# Patient Record
Sex: MALE | Race: WHITE | Employment: OTHER | URBAN - METROPOLITAN AREA
[De-identification: names, ages, dates, MRNs, and addresses within clinical notes are randomized per-mention and may not be internally consistent; named-entity substitution may affect disease eponyms.]

---

## 2017-01-03 ENCOUNTER — HOSPITAL ENCOUNTER (OUTPATIENT)
Dept: PHYSICAL THERAPY | Facility: CLINIC | Age: 67
Setting detail: THERAPIES SERIES
End: 2017-01-03
Attending: PHYSICAL MEDICINE & REHABILITATION
Payer: MEDICARE

## 2017-01-03 PROCEDURE — 97112 NEUROMUSCULAR REEDUCATION: CPT | Mod: GP | Performed by: PHYSICAL THERAPIST

## 2017-01-03 PROCEDURE — 40000719 ZZHC STATISTIC PT DEPARTMENT NEURO VISIT: Performed by: PHYSICAL THERAPIST

## 2017-01-05 ENCOUNTER — HOSPITAL ENCOUNTER (OUTPATIENT)
Dept: PHYSICAL THERAPY | Facility: CLINIC | Age: 67
Setting detail: THERAPIES SERIES
End: 2017-01-05
Attending: PHYSICAL MEDICINE & REHABILITATION
Payer: MEDICARE

## 2017-01-05 PROCEDURE — 40000719 ZZHC STATISTIC PT DEPARTMENT NEURO VISIT: Performed by: PHYSICAL THERAPIST

## 2017-01-05 PROCEDURE — 97112 NEUROMUSCULAR REEDUCATION: CPT | Mod: GP | Performed by: PHYSICAL THERAPIST

## 2017-01-05 PROCEDURE — 97116 GAIT TRAINING THERAPY: CPT | Mod: GP | Performed by: PHYSICAL THERAPIST

## 2017-01-17 ENCOUNTER — HOSPITAL ENCOUNTER (OUTPATIENT)
Dept: PHYSICAL THERAPY | Facility: CLINIC | Age: 67
Setting detail: THERAPIES SERIES
End: 2017-01-17
Attending: PHYSICAL MEDICINE & REHABILITATION
Payer: MEDICARE

## 2017-01-17 PROCEDURE — 40000719 ZZHC STATISTIC PT DEPARTMENT NEURO VISIT: Performed by: PHYSICAL THERAPIST

## 2017-01-17 PROCEDURE — 97112 NEUROMUSCULAR REEDUCATION: CPT | Mod: GP | Performed by: PHYSICAL THERAPIST

## 2017-01-19 ENCOUNTER — HOSPITAL ENCOUNTER (OUTPATIENT)
Dept: PHYSICAL THERAPY | Facility: CLINIC | Age: 67
Setting detail: THERAPIES SERIES
End: 2017-01-19
Attending: PHYSICAL MEDICINE & REHABILITATION
Payer: MEDICARE

## 2017-01-19 PROCEDURE — 40000719 ZZHC STATISTIC PT DEPARTMENT NEURO VISIT: Performed by: PHYSICAL THERAPIST

## 2017-01-19 PROCEDURE — G8979 MOBILITY GOAL STATUS: HCPCS | Mod: GP,CK | Performed by: PHYSICAL THERAPIST

## 2017-01-19 PROCEDURE — 97116 GAIT TRAINING THERAPY: CPT | Mod: GP | Performed by: PHYSICAL THERAPIST

## 2017-01-19 PROCEDURE — G8978 MOBILITY CURRENT STATUS: HCPCS | Mod: GP,CM | Performed by: PHYSICAL THERAPIST

## 2017-01-19 NOTE — PROGRESS NOTES
Outpatient Physical Therapy Progress Note     Patient: Rene Chowdhury Jr.  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      Objective Measurements:        Objective Measure: 25 foot walk  Details: 34.5 sec, 24 steps.         Objective Measure: Stairs   Details: up and down four 6 inch steps in step to pattern with rail and Adama.  assist for left LE contact due to excessive adduction.  assist and cues descending to bring COM forward.          Goals:    Goal Identifier 3   Goal Description Gentry will be able to ambulate with a wheeled walker in 14.11 sec or less with Adama for left hand on walker for greater safety and efficiency for functional gait in his home.    Target Date 17   Date Met   not met   Progress:  Goal remains in progress.  Performance has fluctuated and is dependent on degree of spasticity he is experiencing on a given day.  Performance has improved over the last few weeks but still slower than previous months.      Goal Identifier 4   Goal Description Gentry will be able to perform a home activity program with assist of his family to address his system impairments and functional limitations.   Target Date 17   Date Met      Progress:  In progress - Gentry and wife Karen state they are doing more standing activity and other therapy activities each day.  Gentry still has issues with cooperation at home at times, per his wife, but Gentry states he is trying to be better.      Goal Identifier New goal 5   Goal Description Gentry will be able to ambulate up and down a flight of steps in a step-to pattern with assist and use of rail in his home environment to demonstrate improved access in his home  without need for chair lift   Target Date 02/06/17   Date Met      Progress: in progress, partially met.  Gentry can navigate up four 6 inch steps in a step to pattern leading with the right, Ue support of railing on the right with Adama.  Descending he requires increased facilitation and assist for safe foot placement and forward wt shift/alignment in frontal plane.  Wife is concerned about his safety with descending at home as he would not have a railing on the right side with descending.      Goal Identifier new goal 6   Goal Description Gentry will be able to ambulate with a quad cane or walking stick for distances of 50 feet with CGA for greater mobility and access in the home environment   Target Date 02/06/17   Date Met   partially met - making progress   Progress:  Gentry has been able to ambulate with a quad cane for distances up to 100 feet with min facilitation for postural control and fwd wt shift and cues for gait sequence and pattern.       Progress Toward Goals:   Progress this reporting period: Gentry has been having more difficulty with walking speed over the past several months, however recently has been showing a gradual improvement again.  Wife reports that at home his left foot position has been better and he has not had as many issues with inversion and supination of his left foot.  In therapy he is also demonstrating improved foot position in standing and walking as well, allowing better forward shift and safer gait pattern.  Still, he is seeming to still have increased spasticity in his left LE and foot than he did over the summer and charan months.  Gentry and his wife spoke to his physicians at the [facility] regarding possible Botox in his left foot/lower leg but they report that his physicians there are resistant to this.          Progress limited due to increase in spasticity in left LE, also noted in left UE.  Progress also cont to be slow due to decreased attention/distractibility,  spatial/visual deficits.  Overall Gentry has made continual gains and has the potential for further positive neuroplastic change to achieve continued functional gains.  It is felt Gentry would benefit from Botox trial to his left ankle/foot/great toe but [facility] are not in agreement with this.  He will then require additional physical therapy interventions and strategies to inhibit tone and promote normal movement patterns and alignments to increase strength and tissue length for progression of safe functional movement.      Plan:  Continue therapy per current plan of care.   Changes to therapy plan of care: cont 2 x per week, reducing frequency as clinically appropriate.     Continue with goals 4 and 6 until 5/9/17  Changes to goals:     Goal Identifier Modified goal 3   Goal Description Gentry will be able to ambulate with a wheeled walker in 24 sec or less with CGA for safety and use of gripeeze glove to secure left hand on walker for greater safety and efficiency for functional gait in his home.    Target Date 04/09/17       Goal Identifier modified goal 5   Goal Description Gentry will be able to ambulate up a flight of steps in a step-to pattern with Adama assist and use of rail on the right side in his home environment to demonstrate improved access in his home with decreased need for chair lift.   Target Date 03/09/17   Date Met      Progress:         Discharge:  No

## 2017-01-24 ENCOUNTER — HOSPITAL ENCOUNTER (OUTPATIENT)
Dept: PHYSICAL THERAPY | Facility: CLINIC | Age: 67
Setting detail: THERAPIES SERIES
End: 2017-01-24
Attending: PHYSICAL MEDICINE & REHABILITATION
Payer: MEDICARE

## 2017-01-24 PROCEDURE — 97116 GAIT TRAINING THERAPY: CPT | Mod: GP | Performed by: PHYSICAL THERAPIST

## 2017-01-24 PROCEDURE — 97112 NEUROMUSCULAR REEDUCATION: CPT | Mod: GP | Performed by: PHYSICAL THERAPIST

## 2017-01-24 PROCEDURE — 40000719 ZZHC STATISTIC PT DEPARTMENT NEURO VISIT: Performed by: PHYSICAL THERAPIST

## 2017-01-24 NOTE — ADDENDUM NOTE
Encounter addended by: Genesis Walters, PT on: 1/24/2017  2:10 PM<BR>     Documentation filed: Inpatient Document Flowsheet, Charges VN

## 2017-01-26 ENCOUNTER — HOSPITAL ENCOUNTER (OUTPATIENT)
Dept: PHYSICAL THERAPY | Facility: CLINIC | Age: 67
Setting detail: THERAPIES SERIES
End: 2017-01-26
Attending: PHYSICAL MEDICINE & REHABILITATION
Payer: MEDICARE

## 2017-01-26 PROCEDURE — 97112 NEUROMUSCULAR REEDUCATION: CPT | Mod: GP | Performed by: PHYSICAL THERAPIST

## 2017-01-26 PROCEDURE — 40000719 ZZHC STATISTIC PT DEPARTMENT NEURO VISIT: Performed by: PHYSICAL THERAPIST

## 2017-01-31 ENCOUNTER — HOSPITAL ENCOUNTER (OUTPATIENT)
Dept: PHYSICAL THERAPY | Facility: CLINIC | Age: 67
Setting detail: THERAPIES SERIES
End: 2017-01-31
Attending: PHYSICAL MEDICINE & REHABILITATION
Payer: MEDICARE

## 2017-01-31 PROCEDURE — 40000719 ZZHC STATISTIC PT DEPARTMENT NEURO VISIT: Performed by: PHYSICAL THERAPIST

## 2017-01-31 PROCEDURE — 97112 NEUROMUSCULAR REEDUCATION: CPT | Mod: GP | Performed by: PHYSICAL THERAPIST

## 2017-01-31 PROCEDURE — 97116 GAIT TRAINING THERAPY: CPT | Mod: GP | Performed by: PHYSICAL THERAPIST

## 2017-02-01 NOTE — ADDENDUM NOTE
Encounter addended by: Genesis Walters, PT on: 2/1/2017  4:35 PM<BR>     Documentation filed: Inpatient Document Flowsheet, Notes Section

## 2017-02-01 NOTE — ADDENDUM NOTE
Encounter addended by: Genesis Walters, PT on: 2/1/2017  4:40 PM<BR>     Documentation filed: Notes Section

## 2017-02-01 NOTE — ADDENDUM NOTE
Encounter addended by: Genesis Walters, PT on: 2/1/2017  4:42 PM<BR>     Documentation filed: Inpatient Document Flowsheet

## 2017-02-01 NOTE — ADDENDUM NOTE
Encounter addended by: Genesis Walters, PT on: 2/1/2017  4:18 PM<BR>     Documentation filed: Notes Section

## 2017-02-01 NOTE — ADDENDUM NOTE
Encounter addended by: Genesis Walters, PT on: 2/1/2017  4:41 PM<BR>     Documentation filed: Inpatient Document Flowsheet

## 2017-02-01 NOTE — ADDENDUM NOTE
Encounter addended by: Genesis Walters, PT on: 2/1/2017  4:37 PM<BR>     Documentation filed: Inpatient Document Flowsheet

## 2017-02-02 ENCOUNTER — HOSPITAL ENCOUNTER (OUTPATIENT)
Dept: PHYSICAL THERAPY | Facility: CLINIC | Age: 67
Setting detail: THERAPIES SERIES
End: 2017-02-02
Attending: PHYSICAL MEDICINE & REHABILITATION
Payer: MEDICARE

## 2017-02-02 PROCEDURE — 97116 GAIT TRAINING THERAPY: CPT | Mod: GP | Performed by: PHYSICAL THERAPIST

## 2017-02-02 PROCEDURE — 40000719 ZZHC STATISTIC PT DEPARTMENT NEURO VISIT: Performed by: PHYSICAL THERAPIST

## 2017-02-07 ENCOUNTER — HOSPITAL ENCOUNTER (OUTPATIENT)
Dept: PHYSICAL THERAPY | Facility: CLINIC | Age: 67
Setting detail: THERAPIES SERIES
End: 2017-02-07
Attending: PHYSICAL MEDICINE & REHABILITATION
Payer: MEDICARE

## 2017-02-07 PROCEDURE — 97110 THERAPEUTIC EXERCISES: CPT | Mod: GP | Performed by: PHYSICAL THERAPIST

## 2017-02-07 PROCEDURE — 40000719 ZZHC STATISTIC PT DEPARTMENT NEURO VISIT: Performed by: PHYSICAL THERAPIST

## 2017-02-07 PROCEDURE — 97116 GAIT TRAINING THERAPY: CPT | Mod: GP | Performed by: PHYSICAL THERAPIST

## 2017-02-09 ENCOUNTER — HOSPITAL ENCOUNTER (OUTPATIENT)
Dept: PHYSICAL THERAPY | Facility: CLINIC | Age: 67
Setting detail: THERAPIES SERIES
End: 2017-02-09
Attending: PHYSICAL MEDICINE & REHABILITATION
Payer: MEDICARE

## 2017-02-09 PROCEDURE — 40000719 ZZHC STATISTIC PT DEPARTMENT NEURO VISIT: Performed by: PHYSICAL THERAPIST

## 2017-02-09 PROCEDURE — 97110 THERAPEUTIC EXERCISES: CPT | Mod: GP | Performed by: PHYSICAL THERAPIST

## 2017-02-09 PROCEDURE — 97116 GAIT TRAINING THERAPY: CPT | Mod: GP | Performed by: PHYSICAL THERAPIST

## 2017-02-14 ENCOUNTER — HOSPITAL ENCOUNTER (OUTPATIENT)
Dept: PHYSICAL THERAPY | Facility: CLINIC | Age: 67
Setting detail: THERAPIES SERIES
End: 2017-02-14
Attending: PHYSICAL MEDICINE & REHABILITATION
Payer: MEDICARE

## 2017-02-14 PROCEDURE — 97112 NEUROMUSCULAR REEDUCATION: CPT | Mod: GP | Performed by: PHYSICAL THERAPIST

## 2017-02-14 PROCEDURE — 40000719 ZZHC STATISTIC PT DEPARTMENT NEURO VISIT: Performed by: PHYSICAL THERAPIST

## 2017-02-14 PROCEDURE — 97110 THERAPEUTIC EXERCISES: CPT | Mod: GP | Performed by: PHYSICAL THERAPIST

## 2017-02-16 ENCOUNTER — HOSPITAL ENCOUNTER (OUTPATIENT)
Dept: PHYSICAL THERAPY | Facility: CLINIC | Age: 67
Setting detail: THERAPIES SERIES
End: 2017-02-16
Attending: PHYSICAL MEDICINE & REHABILITATION
Payer: MEDICARE

## 2017-02-16 PROCEDURE — 40000719 ZZHC STATISTIC PT DEPARTMENT NEURO VISIT: Performed by: PHYSICAL THERAPIST

## 2017-02-16 PROCEDURE — 97116 GAIT TRAINING THERAPY: CPT | Mod: GP | Performed by: PHYSICAL THERAPIST

## 2017-02-16 PROCEDURE — 97110 THERAPEUTIC EXERCISES: CPT | Mod: GP | Performed by: PHYSICAL THERAPIST

## 2017-02-21 ENCOUNTER — HOSPITAL ENCOUNTER (OUTPATIENT)
Dept: PHYSICAL THERAPY | Facility: CLINIC | Age: 67
Setting detail: THERAPIES SERIES
End: 2017-02-21
Attending: PHYSICAL MEDICINE & REHABILITATION
Payer: MEDICARE

## 2017-02-21 PROCEDURE — 40000719 ZZHC STATISTIC PT DEPARTMENT NEURO VISIT: Performed by: PHYSICAL THERAPIST

## 2017-02-21 PROCEDURE — 97116 GAIT TRAINING THERAPY: CPT | Mod: GP | Performed by: PHYSICAL THERAPIST

## 2017-02-21 PROCEDURE — 97110 THERAPEUTIC EXERCISES: CPT | Mod: GP | Performed by: PHYSICAL THERAPIST

## 2017-02-23 ENCOUNTER — HOSPITAL ENCOUNTER (OUTPATIENT)
Dept: PHYSICAL THERAPY | Facility: CLINIC | Age: 67
Setting detail: THERAPIES SERIES
End: 2017-02-23
Attending: PHYSICAL MEDICINE & REHABILITATION
Payer: MEDICARE

## 2017-02-23 PROCEDURE — 97116 GAIT TRAINING THERAPY: CPT | Mod: GP | Performed by: PHYSICAL THERAPIST

## 2017-02-23 PROCEDURE — G8979 MOBILITY GOAL STATUS: HCPCS | Mod: GP,CL | Performed by: PHYSICAL THERAPIST

## 2017-02-23 PROCEDURE — 97112 NEUROMUSCULAR REEDUCATION: CPT | Mod: GP | Performed by: PHYSICAL THERAPIST

## 2017-02-23 PROCEDURE — G8978 MOBILITY CURRENT STATUS: HCPCS | Mod: GP,CL | Performed by: PHYSICAL THERAPIST

## 2017-02-23 PROCEDURE — 40000719 ZZHC STATISTIC PT DEPARTMENT NEURO VISIT: Performed by: PHYSICAL THERAPIST

## 2017-02-23 NOTE — PROGRESS NOTES
[facility]      OUTPATIENT PHYSICAL THERAPY  PLAN OF TREATMENT FOR OUTPATIENT REHABILITATION    Patient's Last Name, First Name, M.I.                YOB: 1950  Rene Chowdhury                        Provider's Name  [facility] Medical Record No.  [id number]                               Onset Date: 11/7/2013   Start of Care Date: 8/20/2014    Type:     _X_PT   ___OT   ___SLP Medical Diagnosis: hemorrhagic CVA with left hemiplegia                       PT Diagnosis: dependence with mobility and self cares due to left hemiplegia and left neglect      _________________________________________________________________________________  Plan of Treatment:    Frequency/Duration: 2 x per week for 90 days decreasing to 1 x per week as clinically appropriate     Goals:    Goal Identifier Modified goal 3   Goal Description Gentry will be able to ambulate with a wheeled walker in 24 sec or less with CGA for safety and use of gripeeze glove to secure left hand on walker for greater safety and efficiency for functional gait in his home.    Target Date 04/09/17   Date Met      Progress:     Goal Identifier 4   Goal Description Gentry will be able to perform a home activity program with assist of his family to address his system impairments and functional limitations.   Target Date 05/09/17   Date Met      Progress:     Goal Identifier Modified goal 5   Goal Description Gentry will be able to ambulate up a flight of steps in a step-to pattern with Adama assist and use of rail on the right side in his home environment to demonstrate improved access in his home with decreased need for chair lift.    Target Date 03/09/17   Date Met      Progress:     Goal Identifier 6   Goal Description Gentry will be able to ambulate with a quad cane or walking stick for distances of 50 feet with CGA for greater  mobility and access in the home environment   Target Date 05/09/17   Date Met      Progress:     Progress Toward Goals:   See progress note dated 1/21/17    Certification date from 2/7/2017 to 5/8/2017.    Genesis Walters, PT          I CERTIFY THE NEED FOR THESE SERVICES FURNISHED UNDER        THIS PLAN OF TREATMENT AND WHILE UNDER MY CARE     (Physician co-signature of this document indicates review and certification of the therapy plan).                Referring Provider: Eddi Robles MD

## 2017-02-23 NOTE — PROGRESS NOTES
Outpatient Physical Therapy Progress Note     Patient: Rene Chowdhury Jr.  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      Objective Measurements:  Objective Measure: Sit to stand  Details: 5/5 trials maintained pressure on left foot througout transfer but still greater wt shift to right - cues to increase shift to left throughout movement.    Objective Measure: 25 foot walk  Details: with ottobock AFO 27.5 sec, 25 steps.  2nd trial 44 sec. hallway with distractions of other people.   Repeat trial -  22.6 sec, 20 steps.  20.3 sec, 21 steps.  no distractions, cues for larger step length.          Goal Identifier Modified goal 3   Goal Description Gentry will be able to ambulate with a wheeled walker in 24 sec or less with CGA for safety and use of gripeeze glove to secure left hand on walker for greater safety and efficiency for functional gait in his home.    Target Date 17   Date Met      Progress: Goal Met with use of ottobock AFO and when no distractions present.  With distractions pt needs cues to keep walking and stay on task.  Without AFO, spasticity and tissue restriction affects fluidity of gait and foot position, resulting in greater time.  Continue with this goal for consistency both with/ without AFO.      Goal Identifier 4   Goal Description Gentry will be able to perform a home activity program with assist of his family to address his system impairments and functional limitations.   Target Date 17   Date Met      Progress:  Doing well with standing activities at home.  Also doing some walking but both pt and wife feel nervous about walking at home due  to tone in left foot that causes inversion and supination at foot/ankle, decreasing his stability.  Making better effort to work on things at home.        Goal Identifier Modified goal 5   Goal Description Gentry will be able to ambulate up a flight of steps in a step-to pattern with Adama assist and use of rail on the right side in his home environment to demonstrate improved access in his home with decreased need for chair lift.    Target Date 03/09/17   Date Met      Progress: Stairs have not been addressed as much in therapy sessions due to focus on AFO trials for greater safety with gait.  Able to navigate up four 6 inch steps in a step to pattern leading with the right, Ue support of railing on the right with Adama. Descending he requires increased facilitation and assist for safe foot placement and forward wt shift/alignment in frontal plane. Wife is concerned about his safety with descending at home as he would not have a railing on the right side with descending     Goal Identifier 6   Goal Description Gentry will be able to ambulate with a quad cane or walking stick for distances of 50 feet with CGA for greater mobility and access in the home environment   Target Date 05/09/17   Date Met      Progress:  Gentry has been able to ambulate with a quad cane for distances up to 100 feet with min facilitation for postural control and fwd wt shift and cues for gait sequence and pattern.   Again, focus has been more on gait with walker and AFO over the recent weeks.       Progress Toward Goals:   Progress this reporting period: Gentry has shown improved gait stability and control with trials of off the shelf AFOs (Celso Blue Rocker and Ottobock Walk-On).  Arranging to have orthotist come to session for collaboration and consultation to determine best option and also if he may benefit more from a custom AFO vs. Off the shelf.  He still gets some inversion/supination of his left foot in the off the shelf AFOs due to  limited positioning control at the foot of these braces.  Still he seems to get better clearance and improved heelstrike although still not full.   In order for Gentry to improve his safety and stability with gait in order to increase the frequency of walking / mobility in the home, it is felt that he will need something to control the tone/spasticity and tissue restrictions in his left foot/ankle.  [facility] and [facility] in his left UE.  Unfortunately they do not feel that Botox trial to his left LE/Foot would be appropriate at this time.       Progress limited due to increased spasticity and malalignment of left foot/ankle.  This is limiting he and his wife's comfort level with increasing his gait frequency in the home due to some close calls with losing his balance due to his foot rolling (even with ankle support).  Gentry needs to have options for safe mobility in the home (with assist of his wife) to further increase his abilities, improve general conditioning for health and greater activity tolerance and for continued functional neuroplastic benefits.     Plan:  Continue therapy per current plan of care of 2 x perweek, reducing frequency as clinically appropriate.    Changes to goals: Continue with all goals until 5/9/17.     Discharge:  No

## 2017-03-14 ENCOUNTER — HOSPITAL ENCOUNTER (OUTPATIENT)
Dept: PHYSICAL THERAPY | Facility: CLINIC | Age: 67
Setting detail: THERAPIES SERIES
End: 2017-03-14
Attending: PHYSICAL MEDICINE & REHABILITATION
Payer: MEDICARE

## 2017-03-14 PROCEDURE — 97116 GAIT TRAINING THERAPY: CPT | Mod: GP | Performed by: PHYSICAL THERAPIST

## 2017-03-14 PROCEDURE — 40000719 ZZHC STATISTIC PT DEPARTMENT NEURO VISIT: Performed by: PHYSICAL THERAPIST

## 2017-03-16 ENCOUNTER — HOSPITAL ENCOUNTER (OUTPATIENT)
Dept: PHYSICAL THERAPY | Facility: CLINIC | Age: 67
Setting detail: THERAPIES SERIES
End: 2017-03-16
Attending: PHYSICAL MEDICINE & REHABILITATION
Payer: MEDICARE

## 2017-03-16 PROCEDURE — 40000719 ZZHC STATISTIC PT DEPARTMENT NEURO VISIT: Performed by: PHYSICAL THERAPIST

## 2017-03-16 PROCEDURE — 97116 GAIT TRAINING THERAPY: CPT | Mod: GP | Performed by: PHYSICAL THERAPIST

## 2017-03-16 PROCEDURE — 97110 THERAPEUTIC EXERCISES: CPT | Mod: GP | Performed by: PHYSICAL THERAPIST

## 2017-03-16 PROCEDURE — 97112 NEUROMUSCULAR REEDUCATION: CPT | Mod: GP | Performed by: PHYSICAL THERAPIST

## 2017-03-23 ENCOUNTER — HOSPITAL ENCOUNTER (OUTPATIENT)
Dept: PHYSICAL THERAPY | Facility: CLINIC | Age: 67
Setting detail: THERAPIES SERIES
End: 2017-03-23
Attending: PHYSICAL MEDICINE & REHABILITATION
Payer: MEDICARE

## 2017-03-23 PROCEDURE — 97110 THERAPEUTIC EXERCISES: CPT | Mod: GP | Performed by: PHYSICAL THERAPIST

## 2017-03-23 PROCEDURE — 97112 NEUROMUSCULAR REEDUCATION: CPT | Mod: GP | Performed by: PHYSICAL THERAPIST

## 2017-03-23 PROCEDURE — 40000719 ZZHC STATISTIC PT DEPARTMENT NEURO VISIT: Performed by: PHYSICAL THERAPIST

## 2017-03-23 NOTE — ADDENDUM NOTE
Encounter addended by: Genesis Walters, PT on: 3/23/2017 10:47 AM<BR>     Actions taken: Pend clinical note

## 2017-03-23 NOTE — ADDENDUM NOTE
Encounter addended by: Genesis Walters, PT on: 3/23/2017  3:34 PM<BR>     Actions taken: Sign clinical note, Flowsheet accepted

## 2017-03-28 ENCOUNTER — HOSPITAL ENCOUNTER (OUTPATIENT)
Dept: PHYSICAL THERAPY | Facility: CLINIC | Age: 67
Setting detail: THERAPIES SERIES
End: 2017-03-28
Attending: PHYSICAL MEDICINE & REHABILITATION
Payer: MEDICARE

## 2017-03-28 PROCEDURE — 97112 NEUROMUSCULAR REEDUCATION: CPT | Mod: GP | Performed by: PHYSICAL THERAPIST

## 2017-03-28 PROCEDURE — 97116 GAIT TRAINING THERAPY: CPT | Mod: GP | Performed by: PHYSICAL THERAPIST

## 2017-03-28 PROCEDURE — 40000719 ZZHC STATISTIC PT DEPARTMENT NEURO VISIT: Performed by: PHYSICAL THERAPIST

## 2017-04-04 ENCOUNTER — HOSPITAL ENCOUNTER (OUTPATIENT)
Dept: PHYSICAL THERAPY | Facility: CLINIC | Age: 67
Setting detail: THERAPIES SERIES
End: 2017-04-04
Attending: PHYSICAL MEDICINE & REHABILITATION
Payer: MEDICARE

## 2017-04-04 PROCEDURE — 97116 GAIT TRAINING THERAPY: CPT | Mod: GP | Performed by: PHYSICAL THERAPIST

## 2017-04-04 PROCEDURE — 40000719 ZZHC STATISTIC PT DEPARTMENT NEURO VISIT: Performed by: PHYSICAL THERAPIST

## 2017-04-04 PROCEDURE — 97112 NEUROMUSCULAR REEDUCATION: CPT | Mod: GP | Performed by: PHYSICAL THERAPIST

## 2017-04-11 ENCOUNTER — HOSPITAL ENCOUNTER (OUTPATIENT)
Dept: PHYSICAL THERAPY | Facility: CLINIC | Age: 67
Setting detail: THERAPIES SERIES
End: 2017-04-11
Attending: PHYSICAL MEDICINE & REHABILITATION
Payer: MEDICARE

## 2017-04-11 PROCEDURE — 97112 NEUROMUSCULAR REEDUCATION: CPT | Mod: GP | Performed by: PHYSICAL THERAPIST

## 2017-04-11 PROCEDURE — 40000719 ZZHC STATISTIC PT DEPARTMENT NEURO VISIT: Performed by: PHYSICAL THERAPIST

## 2017-04-13 ENCOUNTER — HOSPITAL ENCOUNTER (OUTPATIENT)
Dept: PHYSICAL THERAPY | Facility: CLINIC | Age: 67
Setting detail: THERAPIES SERIES
End: 2017-04-13
Attending: PHYSICAL MEDICINE & REHABILITATION
Payer: MEDICARE

## 2017-04-13 PROCEDURE — 97116 GAIT TRAINING THERAPY: CPT | Mod: GP | Performed by: PHYSICAL THERAPIST

## 2017-04-13 PROCEDURE — 40000719 ZZHC STATISTIC PT DEPARTMENT NEURO VISIT: Performed by: PHYSICAL THERAPIST

## 2017-04-13 PROCEDURE — 97112 NEUROMUSCULAR REEDUCATION: CPT | Mod: GP | Performed by: PHYSICAL THERAPIST

## 2017-04-18 ENCOUNTER — HOSPITAL ENCOUNTER (OUTPATIENT)
Dept: PHYSICAL THERAPY | Facility: CLINIC | Age: 67
Setting detail: THERAPIES SERIES
End: 2017-04-18
Attending: PHYSICAL MEDICINE & REHABILITATION
Payer: MEDICARE

## 2017-04-18 PROCEDURE — 97116 GAIT TRAINING THERAPY: CPT | Mod: GP,KX | Performed by: PHYSICAL THERAPIST

## 2017-04-18 PROCEDURE — 97112 NEUROMUSCULAR REEDUCATION: CPT | Mod: GP | Performed by: PHYSICAL THERAPIST

## 2017-04-18 PROCEDURE — 40000719 ZZHC STATISTIC PT DEPARTMENT NEURO VISIT: Performed by: PHYSICAL THERAPIST

## 2017-04-20 ENCOUNTER — HOSPITAL ENCOUNTER (OUTPATIENT)
Dept: PHYSICAL THERAPY | Facility: CLINIC | Age: 67
Setting detail: THERAPIES SERIES
End: 2017-04-20
Attending: PHYSICAL MEDICINE & REHABILITATION
Payer: MEDICARE

## 2017-04-20 PROCEDURE — 40000719 ZZHC STATISTIC PT DEPARTMENT NEURO VISIT: Performed by: PHYSICAL THERAPIST

## 2017-04-20 PROCEDURE — 97116 GAIT TRAINING THERAPY: CPT | Mod: GP,KX | Performed by: PHYSICAL THERAPIST

## 2017-04-20 PROCEDURE — 97112 NEUROMUSCULAR REEDUCATION: CPT | Mod: GP,KX | Performed by: PHYSICAL THERAPIST

## 2017-04-25 ENCOUNTER — HOSPITAL ENCOUNTER (OUTPATIENT)
Dept: PHYSICAL THERAPY | Facility: CLINIC | Age: 67
Setting detail: THERAPIES SERIES
End: 2017-04-25
Attending: PHYSICAL MEDICINE & REHABILITATION
Payer: MEDICARE

## 2017-04-25 PROCEDURE — 97116 GAIT TRAINING THERAPY: CPT | Mod: GP,KX | Performed by: PHYSICAL THERAPIST

## 2017-04-25 PROCEDURE — 40000719 ZZHC STATISTIC PT DEPARTMENT NEURO VISIT: Performed by: PHYSICAL THERAPIST

## 2017-04-25 PROCEDURE — 97112 NEUROMUSCULAR REEDUCATION: CPT | Mod: GP,KX | Performed by: PHYSICAL THERAPIST

## 2017-04-25 PROCEDURE — G8978 MOBILITY CURRENT STATUS: HCPCS | Mod: GP,CL | Performed by: PHYSICAL THERAPIST

## 2017-04-25 PROCEDURE — G8979 MOBILITY GOAL STATUS: HCPCS | Mod: GP,CL | Performed by: PHYSICAL THERAPIST

## 2017-04-25 NOTE — PROGRESS NOTES
Outpatient Physical Therapy Progress Note     Patient: Rene Chowdhury Jr.  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      Objective Measurements:        Objective Measure: 25 foot walk  Details: with front WW - 29.3 sec,  26 steps.  2nd trial - 28.6 sec, 24 steps.      Objective Measure: w/c to mat  Details: cues and CGA intermittently           Goals:    Goal Identifier Modified goal 3   Goal Description Gentry will be able to ambulate with a wheeled walker in 24 sec or less with CGA for safety and use of gripeeze glove to secure left hand on walker for greater safety and efficiency for functional gait in his home.    Target Date 17   Date Met      Progress: Gentry has been improving with his left foot spasticity and positioning and therefore it was decided to hold off on any AFO at this time in order to further progress tissue length/alignment of left LE and foot.  He is not able to ambulate as quickly without the AFO at this time but has made good progress with controlling his left LE alingment and position with only slight reduction in speed.  Will cont to work on increasing speed by increasing left LE tissue length, alignment and mm control for more normal movement patterns that will help his gait and balance responses become safer and more efficient.       Goal Identifier 4   Goal Description Gentry will be able to perform a home activity program with assist of his family to address his system impairments and functional limitations.   Target Date 17   Date Met      Progress: in progress.  Gentry and his wife Karen have been consistent with therapy activities at home and Gentry is taking responsibility for doing them and cooperating more with his wife.      Goal  Identifier Modified goal 5   Goal Description Gentry will be able to ambulate up a flight of steps in a step-to pattern with Adama assist and use of rail on the right side in his home environment to demonstrate improved access in his home with decreased need for chair lift.    Target Date 05/09/17   Date Met   (has dmonstrated in clinic. not yet completed at home)   Progress:  Gentry has demonstrated going up and down four 6 inch steps in a step to pattern with railing on his right side but has not tried this at home yet.  Gentry and Karen do not yet feel confident with this at home since he has a full flight.  Going up is easier for Gentry so will plan to focus on increasing his efficiency with ascending in order to do this at home first, as they both feel much more concerned about descending a full flight of stairs at home.      Goal Identifier 6   Goal Description Gentry will be able to ambulate with a quad cane or walking stick for distances of 50 feet with CGA for greater mobility and access in the home environment   Target Date 05/09/17   Date Met   in progress   Progress:  Gentry is making nice progress with use of the quad cane and has progressed to distances of 75 feet with facilitation for forward wt shift into left stance phase with key point of control for trunk coactivation and left hip extensors in midstance to terminal stance.          Progress Toward Goals:   Progress this reporting period: Treatment interventions over the last 4-5 week have been focusing on left LE wt bearing with proper left foot/LE alignments to help increased functional length of LE musculature, reduce spasticity and help improve neuromotor function.  With this focus of treatment, Gentry has been improving with his ability to achieve more midline alignment in standing which has resulted in improved left LE loading and alignment of left foot/leg.   Because of this improved positioning he has demonstrated decreased spasticity of his left LE  and decreased supination of his left foot during gait.  With these positive changes it is felt that it is best to hold off on obtaining any bracing/AFOs for now to maximize further gains that he has the potential to make in tissue length, spasticity and neuromotor function.  Pt and his wife have reported decrease in losses of balance and poor positioning of left foot during mobility at home as well.      Progress limited due to spasticity and malalignment of left foot/ankle however this has been getting better now with adjustment in our treatment interventions (greater focus on challenges with left LE loading and positions that provide stretch of tissues through larger ranges of motion, particularly gastroc-soleus).  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      Plan:  Continue therapy per current plan of care.  In light of Gentry's recent improvements in his alignment and gait, it is felt that 2 x per week continues to be appropriate.   Changes to goals: cont with goals above until 5/9/17.     Discharge:  No

## 2017-04-27 ENCOUNTER — HOSPITAL ENCOUNTER (OUTPATIENT)
Dept: PHYSICAL THERAPY | Facility: CLINIC | Age: 67
Setting detail: THERAPIES SERIES
End: 2017-04-27
Attending: PHYSICAL MEDICINE & REHABILITATION
Payer: MEDICARE

## 2017-04-27 PROCEDURE — 97112 NEUROMUSCULAR REEDUCATION: CPT | Mod: GP,KX | Performed by: PHYSICAL THERAPIST

## 2017-04-27 PROCEDURE — 97116 GAIT TRAINING THERAPY: CPT | Mod: GP,KX | Performed by: PHYSICAL THERAPIST

## 2017-04-27 PROCEDURE — 40000719 ZZHC STATISTIC PT DEPARTMENT NEURO VISIT: Performed by: PHYSICAL THERAPIST

## 2017-05-02 ENCOUNTER — HOSPITAL ENCOUNTER (OUTPATIENT)
Dept: PHYSICAL THERAPY | Facility: CLINIC | Age: 67
Setting detail: THERAPIES SERIES
End: 2017-05-02
Attending: PHYSICAL MEDICINE & REHABILITATION
Payer: MEDICARE

## 2017-05-02 PROCEDURE — 40000719 ZZHC STATISTIC PT DEPARTMENT NEURO VISIT: Performed by: PHYSICAL THERAPIST

## 2017-05-02 PROCEDURE — 97112 NEUROMUSCULAR REEDUCATION: CPT | Mod: GP,KX | Performed by: PHYSICAL THERAPIST

## 2017-05-04 ENCOUNTER — HOSPITAL ENCOUNTER (OUTPATIENT)
Dept: PHYSICAL THERAPY | Facility: CLINIC | Age: 67
Setting detail: THERAPIES SERIES
End: 2017-05-04
Attending: PHYSICAL MEDICINE & REHABILITATION
Payer: MEDICARE

## 2017-05-04 PROCEDURE — 97110 THERAPEUTIC EXERCISES: CPT | Mod: GP,KX | Performed by: PHYSICAL THERAPIST

## 2017-05-04 PROCEDURE — 40000719 ZZHC STATISTIC PT DEPARTMENT NEURO VISIT: Performed by: PHYSICAL THERAPIST

## 2017-05-04 PROCEDURE — 97112 NEUROMUSCULAR REEDUCATION: CPT | Mod: GP,KX | Performed by: PHYSICAL THERAPIST

## 2017-05-09 ENCOUNTER — HOSPITAL ENCOUNTER (OUTPATIENT)
Dept: PHYSICAL THERAPY | Facility: CLINIC | Age: 67
Setting detail: THERAPIES SERIES
End: 2017-05-09
Attending: PHYSICAL MEDICINE & REHABILITATION
Payer: MEDICARE

## 2017-05-09 PROCEDURE — 97112 NEUROMUSCULAR REEDUCATION: CPT | Mod: GP,KX | Performed by: PHYSICAL THERAPIST

## 2017-05-09 PROCEDURE — 97116 GAIT TRAINING THERAPY: CPT | Mod: GP,KX | Performed by: PHYSICAL THERAPIST

## 2017-05-09 PROCEDURE — 40000719 ZZHC STATISTIC PT DEPARTMENT NEURO VISIT: Performed by: PHYSICAL THERAPIST

## 2017-05-11 ENCOUNTER — HOSPITAL ENCOUNTER (OUTPATIENT)
Dept: PHYSICAL THERAPY | Facility: CLINIC | Age: 67
Setting detail: THERAPIES SERIES
End: 2017-05-11
Attending: PHYSICAL MEDICINE & REHABILITATION
Payer: MEDICARE

## 2017-05-11 PROCEDURE — 97116 GAIT TRAINING THERAPY: CPT | Mod: GP,KX | Performed by: PHYSICAL THERAPIST

## 2017-05-11 PROCEDURE — 97112 NEUROMUSCULAR REEDUCATION: CPT | Mod: GP,KX | Performed by: PHYSICAL THERAPIST

## 2017-05-11 PROCEDURE — 40000719 ZZHC STATISTIC PT DEPARTMENT NEURO VISIT: Performed by: PHYSICAL THERAPIST

## 2017-05-11 NOTE — PROGRESS NOTES
[facility]      OUTPATIENT PHYSICAL THERAPY  PLAN OF TREATMENT FOR OUTPATIENT REHABILITATION    Patient's Last Name, First Name, M.I.                YOB: 1950  Rene Chowdhury                        Provider's Name  [facility] Medical Record No.  [id number]                               Onset Date: 11/7/2013   Start of Care Date: 8/20/2014    Type:     _X_PT   ___OT   ___SLP Medical Diagnosis: hemorrhagic CVA with left hemiplegia                       PT Diagnosis: dependence with mobility and self cares due to left hemiplegia and left neglect, homonomous hemianopsia      _________________________________________________________________________________  Plan of Treatment:    Frequency/Duration: 2 x per week x 90 days.  Decrease to 1 x per week as clinically appropriate.       Goals:    Goal Identifier  3   Goal Description Gentry will be able to ambulate with a wheeled walker in 24 sec or less with CGA for safety and use of gripeeze glove to secure left hand on walker for greater safety and efficiency for functional gait in his home.    Target Date 05/09/17   Date Met      Progress:  with front WW - 29.3 sec, 26 steps. 2nd trial - 28.6 sec, 24 steps.  Improving symmetry and stability of gait pattern with significantly reduced incidence of catching/tripping on left foot.      Goal Identifier 4   Goal Description Gentry will be able to perform a home activity program with assist of his family to address his system impairments and functional limitations.   Target Date 05/09/17   Date Met   in progress   Progress:  Gentry and his wife both state he has been participating more regularly in his standing therapy activities as well as taking initiative to work on extremity movement in sitting.      Goal Identifier  5   Goal Description Gentry will be able to ambulate up a flight of  steps in a step-to pattern with Adama assist and use of rail on the right side in his home environment to demonstrate improved access in his home with decreased need for chair lift.    Target Date 05/09/17   Date Met   improving   Progress:  Gentry has been able to navigate up and down four steps in a step to pattern with min to modA depending on his fatigue level, using railing on right side.  At home he and his wife do not yet feel he is ready to navigate a whole flight.       Goal Identifier 6   Goal Description Gentry will be able to ambulate with a quad cane or walking stick for distances of 50 feet with CGA for greater mobility and access in the home environment   Target Date 05/09/17   Date Met   in progress   Progress:  Has progressed to walking with the quad cane distances to 105 feet with min to modA/facilitation for emphasis on left heelstrike and sustained left hip ext into left stance/term stance which has allowed a more efficient and balanced gait pattern.  eGntry has not had any losses of balance at home or in therapy sessions in several weeks.       Progress Toward Goals:   Progress this reporting period: See progress note dated 4/25/17.     Updated Goals:  Cont with goals 3 through 5 through 8/7/2017      Goal Identifier 6   Goal Description Gentry will be able to ambulate with a quad cane distances of 100 feet with Adama for greater mobility and access around the home environment   Target Date 08/07/17       Certification date from 5/9/2017 to 8/7/2017.    Genesis Walters, PT          I CERTIFY THE NEED FOR THESE SERVICES FURNISHED UNDER        THIS PLAN OF TREATMENT AND WHILE UNDER MY CARE     (Physician co-signature of this document indicates review and certification of the therapy plan).                Referring Provider:  Eddi Robles   ([facility])

## 2017-05-16 ENCOUNTER — HOSPITAL ENCOUNTER (OUTPATIENT)
Dept: PHYSICAL THERAPY | Facility: CLINIC | Age: 67
Setting detail: THERAPIES SERIES
End: 2017-05-16
Attending: PHYSICAL MEDICINE & REHABILITATION
Payer: MEDICARE

## 2017-05-16 PROCEDURE — 40000719 ZZHC STATISTIC PT DEPARTMENT NEURO VISIT: Performed by: PHYSICAL THERAPIST

## 2017-05-16 PROCEDURE — 97112 NEUROMUSCULAR REEDUCATION: CPT | Mod: GP,KX | Performed by: PHYSICAL THERAPIST

## 2017-05-16 PROCEDURE — 97116 GAIT TRAINING THERAPY: CPT | Mod: GP,KX | Performed by: PHYSICAL THERAPIST

## 2017-05-18 ENCOUNTER — HOSPITAL ENCOUNTER (OUTPATIENT)
Dept: PHYSICAL THERAPY | Facility: CLINIC | Age: 67
Setting detail: THERAPIES SERIES
End: 2017-05-18
Attending: PHYSICAL MEDICINE & REHABILITATION
Payer: MEDICARE

## 2017-05-18 PROCEDURE — 97110 THERAPEUTIC EXERCISES: CPT | Mod: GP,KX | Performed by: PHYSICAL THERAPIST

## 2017-05-18 PROCEDURE — 40000719 ZZHC STATISTIC PT DEPARTMENT NEURO VISIT: Performed by: PHYSICAL THERAPIST

## 2017-05-18 PROCEDURE — 97116 GAIT TRAINING THERAPY: CPT | Mod: GP,KX | Performed by: PHYSICAL THERAPIST

## 2017-05-23 ENCOUNTER — HOSPITAL ENCOUNTER (OUTPATIENT)
Dept: PHYSICAL THERAPY | Facility: CLINIC | Age: 67
Setting detail: THERAPIES SERIES
End: 2017-05-23
Attending: PHYSICAL MEDICINE & REHABILITATION
Payer: MEDICARE

## 2017-05-23 PROCEDURE — 97110 THERAPEUTIC EXERCISES: CPT | Mod: GP,KX | Performed by: PHYSICAL THERAPIST

## 2017-05-23 PROCEDURE — 97116 GAIT TRAINING THERAPY: CPT | Mod: GP,KX | Performed by: PHYSICAL THERAPIST

## 2017-05-23 PROCEDURE — 40000719 ZZHC STATISTIC PT DEPARTMENT NEURO VISIT: Performed by: PHYSICAL THERAPIST

## 2017-05-23 PROCEDURE — 97112 NEUROMUSCULAR REEDUCATION: CPT | Mod: GP,KX | Performed by: PHYSICAL THERAPIST

## 2017-05-25 ENCOUNTER — HOSPITAL ENCOUNTER (OUTPATIENT)
Dept: PHYSICAL THERAPY | Facility: CLINIC | Age: 67
Setting detail: THERAPIES SERIES
End: 2017-05-25
Attending: PHYSICAL MEDICINE & REHABILITATION
Payer: MEDICARE

## 2017-05-25 PROCEDURE — 40000719 ZZHC STATISTIC PT DEPARTMENT NEURO VISIT: Performed by: PHYSICAL THERAPIST

## 2017-05-25 PROCEDURE — 97110 THERAPEUTIC EXERCISES: CPT | Mod: GP,KX | Performed by: PHYSICAL THERAPIST

## 2017-05-25 PROCEDURE — 97116 GAIT TRAINING THERAPY: CPT | Mod: GP,KX | Performed by: PHYSICAL THERAPIST

## 2017-05-25 PROCEDURE — 97112 NEUROMUSCULAR REEDUCATION: CPT | Mod: GP,KX | Performed by: PHYSICAL THERAPIST

## 2017-05-30 ENCOUNTER — HOSPITAL ENCOUNTER (OUTPATIENT)
Dept: PHYSICAL THERAPY | Facility: CLINIC | Age: 67
Setting detail: THERAPIES SERIES
End: 2017-05-30
Attending: PHYSICAL MEDICINE & REHABILITATION
Payer: MEDICARE

## 2017-05-30 PROCEDURE — 97112 NEUROMUSCULAR REEDUCATION: CPT | Mod: GP,KX | Performed by: PHYSICAL THERAPIST

## 2017-05-30 PROCEDURE — 97116 GAIT TRAINING THERAPY: CPT | Mod: GP,KX | Performed by: PHYSICAL THERAPIST

## 2017-05-30 PROCEDURE — G8979 MOBILITY GOAL STATUS: HCPCS | Mod: GP,CM | Performed by: PHYSICAL THERAPIST

## 2017-05-30 PROCEDURE — 40000719 ZZHC STATISTIC PT DEPARTMENT NEURO VISIT: Performed by: PHYSICAL THERAPIST

## 2017-05-30 PROCEDURE — 97110 THERAPEUTIC EXERCISES: CPT | Mod: GP,KX | Performed by: PHYSICAL THERAPIST

## 2017-05-30 PROCEDURE — G8978 MOBILITY CURRENT STATUS: HCPCS | Mod: GP,CM | Performed by: PHYSICAL THERAPIST

## 2017-06-01 ENCOUNTER — HOSPITAL ENCOUNTER (OUTPATIENT)
Dept: PHYSICAL THERAPY | Facility: CLINIC | Age: 67
Setting detail: THERAPIES SERIES
End: 2017-06-01
Attending: PHYSICAL MEDICINE & REHABILITATION
Payer: MEDICARE

## 2017-06-01 PROCEDURE — 97112 NEUROMUSCULAR REEDUCATION: CPT | Mod: GP,KX | Performed by: PHYSICAL THERAPIST

## 2017-06-01 PROCEDURE — 97116 GAIT TRAINING THERAPY: CPT | Mod: GP,KX | Performed by: PHYSICAL THERAPIST

## 2017-06-01 PROCEDURE — 40000719 ZZHC STATISTIC PT DEPARTMENT NEURO VISIT: Performed by: PHYSICAL THERAPIST

## 2017-06-01 NOTE — ADDENDUM NOTE
Encounter addended by: Genesis Walters, PT on: 6/1/2017  2:45 PM<BR>     Actions taken: Flowsheet accepted, Sign clinical note

## 2017-06-06 ENCOUNTER — HOSPITAL ENCOUNTER (OUTPATIENT)
Dept: PHYSICAL THERAPY | Facility: CLINIC | Age: 67
Setting detail: THERAPIES SERIES
End: 2017-06-06
Attending: PHYSICAL MEDICINE & REHABILITATION
Payer: MEDICARE

## 2017-06-06 PROCEDURE — 97116 GAIT TRAINING THERAPY: CPT | Mod: GP,KX | Performed by: PHYSICAL THERAPIST

## 2017-06-06 PROCEDURE — 40000719 ZZHC STATISTIC PT DEPARTMENT NEURO VISIT: Performed by: PHYSICAL THERAPIST

## 2017-06-06 PROCEDURE — 97112 NEUROMUSCULAR REEDUCATION: CPT | Mod: GP,KX | Performed by: PHYSICAL THERAPIST

## 2017-06-06 NOTE — ADDENDUM NOTE
Encounter addended by: Genesis Walters, PT on: 6/6/2017  5:01 PM<BR>     Actions taken: Flowsheet data copied forward, Flowsheet accepted

## 2017-06-06 NOTE — ADDENDUM NOTE
Encounter addended by: Genesis Walters, PT on: 6/6/2017  4:57 PM<BR>     Actions taken: Sign clinical note, Flowsheet accepted

## 2017-06-06 NOTE — PROGRESS NOTES
Medicare 10th visit Note    TYRONE Robles ([facility])    Reporting period:  4/25/1017 to 5/30/2017 05/30/17 0900   Signing Clinician's Name / Credentials   Signing clinician's name / credentials Genesis Walters PT   Session Number   Session Number 10/10 medicare   Progress Note/Recertification   Progress Note Completed Date 05/30/17   Recertification Due Date 08/07/17   Mobility: Walking & Moving Around   Mobility Current Status,  (eval/re-eval & every progress note) CM: 80-99% impairment   Current Mobility Modifier Rationale 25 foot timed walk   Mobility Goal,  (eval/re-eval, every progress note, & discharge) CL: 60-79% impairment   Mobility Comments level of impairment has not changed for time but has been able to progress from using walker to using decreased support with quad cane   Goal 3   Goal Identifier 3   Goal Description Gentry will be able to ambulate with a wheeled walker in 24 sec or less with CGA for safety and use of gripeeze glove to secure left hand on walker for greater safety and efficiency for functional gait in his home.    Target Date 08/07/17   Date Met (not recently tested wtih walker. See obj measures with [facility])   Goal 4   Goal Identifier 4   Goal Description Gentry will be able to perform a home activity program with assist of his family to address his system impairments and functional limitations.   Target Date 08/07/17   Date Met (consistent with performing at home.  Progress as approp)   Goal 5   Goal Identifier 5   Goal Description Gentry will be able to ambulate up a flight of steps in a step-to pattern with Adama assist and use of rail on the right side in his home environment to demonstrate improved access in his home with decreased need for chair lift.    Target Date 08/07/17   Date Met (has dmonstrated in clinic. not yet completed at home)   Goal 6   Goal Identifier 6   Goal Description Gentry will be able to ambulate with a quad cane distances of 100 feet  with Lillian for greater mobility and access around the home environment   Target Date 08/07/17   Date Met (more consistent tolerance of 100 feet, improving pattern.)   Subjective Report   Subjective Report States has been working on pulling knees together, also getting more weight on left LE when he can.    Objective Measure 2   Objective Measure 25 foot walk - with quad cane   Details 42.8 sec, 25  steps.    Objective Measure 3   Objective Measure w/c to mat   Details to right - performs with verbal cues only for set up and step sequence.     Therapeutic Procedure/exercise   Minutes 10   Skilled Intervention positioning, cues, passive stretching.    Patient Response cues to stay on task today.    Treatment Detail seated trunk stretching for thor extension and scapular add/depression. progression into rotation with active thoracic extension   Neuromuscular Re-education   Minutes 20   Skilled Intervention Key points of control at left hip extensors, thor ext and abdominals for proper alignment and activation of left side in all planes.  cues for left heel contact with sustained extensor activaiton left LE in addition to facilitation as indicated.    Patient Response right knee gave out with attempt at reduced right UE support.  assisted to mat.  pt improved with repeated trials.    Treatment Detail sit<>standing with emphasis on maintaining wt shift on left LE, cues to pull knees together helps.   standing for sustained left stance in midstance position - right LE out/in and fwd/back with various challenges and with reduction of right UE uspport.     Gait Training   Minutes 10   Skilled Intervention key point of control for trunk coactivation and left hip extensors in midstance to terminal stance (no facil during testing).     Patient Response some fatigue but follwoing cues and facil for fwd wt shift to left LE well.     Treatment Detail gait with quad cane,  timed gait trial - see obj measures.     Plan   Updates to  plan of care cont 2 x per week.  Goals above valid and approrpriate.     Plan for next session left trunk activation with reaching/rotation, sustained left hip activation in stance, alignment in frontal and sagittal planes.  progression to gait with same emphasis   Comments   Comments KX modifier - pt is requiring continued therapy beyond the therapy cap in order to continue to improve neuromotor function for greater safety at home and community with functional mobility and reduce risk for falls and development of secondary impairments that will lead to a greater level of instability   Total Session Time   Total Session Time 40

## 2017-06-06 NOTE — ADDENDUM NOTE
Encounter addended by: Genesis Walters, PT on: 6/6/2017  5:11 PM<BR>     Actions taken: Flowsheet data copied forward, Flowsheet accepted

## 2017-06-06 NOTE — ADDENDUM NOTE
Encounter addended by: Genesis Walters, PT on: 6/6/2017  4:58 PM<BR>     Actions taken: Flowsheet accepted

## 2017-06-06 NOTE — ADDENDUM NOTE
Encounter addended by: Genesis Walters, PT on: 6/6/2017  5:02 PM<BR>     Actions taken: Flowsheet data copied forward, Flowsheet accepted

## 2017-06-06 NOTE — ADDENDUM NOTE
Encounter addended by: Genesis Walters, PT on: 6/6/2017  5:10 PM<BR>     Actions taken: Flowsheet data copied forward, Sign clinical note, Flowsheet accepted

## 2017-06-06 NOTE — ADDENDUM NOTE
Encounter addended by: Genesis Walters, PT on: 6/6/2017 10:36 AM<BR>     Actions taken: Flowsheet accepted

## 2017-06-06 NOTE — ADDENDUM NOTE
Encounter addended by: Genesis Walters, PT on: 6/6/2017  5:03 PM<BR>     Actions taken: Flowsheet data copied forward, Flowsheet accepted

## 2017-06-06 NOTE — ADDENDUM NOTE
Encounter addended by: Genesis Walters, PT on: 6/6/2017  4:59 PM<BR>     Actions taken: Flowsheet accepted

## 2017-06-08 ENCOUNTER — HOSPITAL ENCOUNTER (OUTPATIENT)
Dept: PHYSICAL THERAPY | Facility: CLINIC | Age: 67
Setting detail: THERAPIES SERIES
End: 2017-06-08
Attending: PHYSICAL MEDICINE & REHABILITATION
Payer: MEDICARE

## 2017-06-08 PROCEDURE — 40000719 ZZHC STATISTIC PT DEPARTMENT NEURO VISIT: Performed by: PHYSICAL THERAPIST

## 2017-06-08 PROCEDURE — 97112 NEUROMUSCULAR REEDUCATION: CPT | Mod: GP,KX | Performed by: PHYSICAL THERAPIST

## 2017-06-08 PROCEDURE — 97116 GAIT TRAINING THERAPY: CPT | Mod: GP,KX | Performed by: PHYSICAL THERAPIST

## 2017-06-13 ENCOUNTER — HOSPITAL ENCOUNTER (OUTPATIENT)
Dept: PHYSICAL THERAPY | Facility: CLINIC | Age: 67
Setting detail: THERAPIES SERIES
End: 2017-06-13
Attending: PHYSICAL MEDICINE & REHABILITATION
Payer: MEDICARE

## 2017-06-13 PROCEDURE — 97112 NEUROMUSCULAR REEDUCATION: CPT | Mod: GP,KX | Performed by: PHYSICAL THERAPIST

## 2017-06-13 PROCEDURE — 40000719 ZZHC STATISTIC PT DEPARTMENT NEURO VISIT: Performed by: PHYSICAL THERAPIST

## 2017-06-15 ENCOUNTER — HOSPITAL ENCOUNTER (OUTPATIENT)
Dept: PHYSICAL THERAPY | Facility: CLINIC | Age: 67
Setting detail: THERAPIES SERIES
End: 2017-06-15
Attending: PHYSICAL MEDICINE & REHABILITATION
Payer: MEDICARE

## 2017-06-15 PROCEDURE — 97112 NEUROMUSCULAR REEDUCATION: CPT | Mod: GP,KX | Performed by: PHYSICAL THERAPIST

## 2017-06-15 PROCEDURE — 97116 GAIT TRAINING THERAPY: CPT | Mod: GP,KX | Performed by: PHYSICAL THERAPIST

## 2017-06-15 PROCEDURE — 40000719 ZZHC STATISTIC PT DEPARTMENT NEURO VISIT: Performed by: PHYSICAL THERAPIST

## 2017-06-20 ENCOUNTER — HOSPITAL ENCOUNTER (OUTPATIENT)
Dept: PHYSICAL THERAPY | Facility: CLINIC | Age: 67
Setting detail: THERAPIES SERIES
End: 2017-06-20
Attending: PHYSICAL MEDICINE & REHABILITATION
Payer: MEDICARE

## 2017-06-20 PROCEDURE — 97112 NEUROMUSCULAR REEDUCATION: CPT | Mod: GP,KX | Performed by: PHYSICAL THERAPIST

## 2017-06-20 PROCEDURE — 97116 GAIT TRAINING THERAPY: CPT | Mod: GP,KX | Performed by: PHYSICAL THERAPIST

## 2017-06-22 ENCOUNTER — HOSPITAL ENCOUNTER (OUTPATIENT)
Dept: PHYSICAL THERAPY | Facility: CLINIC | Age: 67
Setting detail: THERAPIES SERIES
End: 2017-06-22
Attending: PHYSICAL MEDICINE & REHABILITATION
Payer: MEDICARE

## 2017-06-22 PROCEDURE — 97116 GAIT TRAINING THERAPY: CPT | Mod: GP,KX | Performed by: PHYSICAL THERAPIST

## 2017-06-22 PROCEDURE — 97112 NEUROMUSCULAR REEDUCATION: CPT | Mod: GP,KX | Performed by: PHYSICAL THERAPIST

## 2017-06-22 PROCEDURE — 40000719 ZZHC STATISTIC PT DEPARTMENT NEURO VISIT: Performed by: PHYSICAL THERAPIST

## 2017-06-27 ENCOUNTER — HOSPITAL ENCOUNTER (OUTPATIENT)
Dept: PHYSICAL THERAPY | Facility: CLINIC | Age: 67
Setting detail: THERAPIES SERIES
End: 2017-06-27
Attending: PHYSICAL MEDICINE & REHABILITATION
Payer: MEDICARE

## 2017-06-27 PROCEDURE — 97112 NEUROMUSCULAR REEDUCATION: CPT | Mod: GP,KX | Performed by: PHYSICAL THERAPIST

## 2017-06-27 PROCEDURE — 40000719 ZZHC STATISTIC PT DEPARTMENT NEURO VISIT: Performed by: PHYSICAL THERAPIST

## 2017-06-29 ENCOUNTER — HOSPITAL ENCOUNTER (OUTPATIENT)
Dept: PHYSICAL THERAPY | Facility: CLINIC | Age: 67
Setting detail: THERAPIES SERIES
End: 2017-06-29
Attending: PHYSICAL MEDICINE & REHABILITATION
Payer: MEDICARE

## 2017-06-29 PROCEDURE — 97112 NEUROMUSCULAR REEDUCATION: CPT | Mod: GP,KX | Performed by: PHYSICAL THERAPIST

## 2017-06-29 PROCEDURE — 40000719 ZZHC STATISTIC PT DEPARTMENT NEURO VISIT: Performed by: PHYSICAL THERAPIST

## 2017-06-29 PROCEDURE — 97116 GAIT TRAINING THERAPY: CPT | Mod: GP,KX | Performed by: PHYSICAL THERAPIST

## 2017-07-07 ENCOUNTER — HOSPITAL ENCOUNTER (OUTPATIENT)
Dept: PHYSICAL THERAPY | Facility: CLINIC | Age: 67
Setting detail: THERAPIES SERIES
End: 2017-07-07
Attending: PHYSICAL MEDICINE & REHABILITATION
Payer: MEDICARE

## 2017-07-07 PROCEDURE — G8979 MOBILITY GOAL STATUS: HCPCS | Mod: GP,CL | Performed by: PHYSICAL THERAPIST

## 2017-07-07 PROCEDURE — 40000719 ZZHC STATISTIC PT DEPARTMENT NEURO VISIT: Performed by: PHYSICAL THERAPIST

## 2017-07-07 PROCEDURE — G8978 MOBILITY CURRENT STATUS: HCPCS | Mod: GP,CM | Performed by: PHYSICAL THERAPIST

## 2017-07-07 PROCEDURE — 97116 GAIT TRAINING THERAPY: CPT | Mod: GP,KX | Performed by: PHYSICAL THERAPIST

## 2017-07-07 NOTE — PROGRESS NOTES
Medicare 10th visit Note     TYRONE Robles ([facility]     Reporting period:  5/30/2017 to 7/7/2017 07/07/17 0900   Signing Clinician's Name / Credentials   Signing clinician's name / credentials Genesis Walters, PT   Session Number   Session Number 10/10 medicare   Progress Note/Recertification   Recertification Due Date 08/07/17   Mobility: Walking & Moving Around   Mobility Current Status,  (eval/re-eval & every progress note) CM: 80-99% impairment   Current Mobility Modifier Rationale 25 foot timed walk   Mobility Goal,  (eval/re-eval, every progress note, & discharge) CL: 60-79% impairment   Mobility Comments wife states Gentry has not done much this week due to the holiday and busy with family so has been more easy to fatigue and left foot has been inverting a bit more this week.    Goal 3   Goal Identifier 3   Goal Description Gentry will be able to ambulate with a wheeled walker in 24 sec or less with CGA for safety and use of gripeeze glove to secure left hand on walker for greater safety and efficiency for functional gait in his home.    Target Date 08/07/17   Date Met (more difficult today d/t tightness left foot ankle & fatigue)   Goal 4   Goal Identifier 4   Goal Description Gentry will be able to perform a home activity program with assist of his family to address his system impairments and functional limitations.   Target Date 08/07/17   Date Met (consistent with performing at home.  Progress as approp)   Goal 5   Goal Identifier 5   Goal Description Gentry will be able to ambulate up a flight of steps in a step-to pattern with Adama assist and use of rail on the right side in his home environment to demonstrate improved access in his home with decreased need for chair lift.    Target Date 08/07/17   Date Met (has done in PT. not yet at home )   Goal 6   Goal Identifier 6   Goal Description Gentry will be able to ambulate with a quad cane distances of 100 feet with Adama for  greater mobility and access around the home environment   Target Date 08/07/17   Date Met (Navjot 100 feet well.  when fatigued needs greater assist/facil)   Subjective Report   Subjective Report no c/o.  feeling good.  TRying to continue to practice standing and wt shifting activities at home.    Objective Measure 2   Objective Measure 25 foot walk   Details with walker - one trial at end of session 41.6 sec.  23 steps   Objective Measure 3   Objective Measure w/c<> mat   Details assist to place left foot for set up, cues for ant wt shift and stepping sequence, Adama for last step back for balance   Gait Training   Minutes 55   Skilled Intervention key points of control right obliques and left hip ext with gait.   cues, instruction education as below.     Patient Response reports it was helpful to see the difference in how each AD affects his wt shift and SMILEY.  Pt fatiguing quickly today with increased left foot clonus and increased inversion of left foot today during gait activities.   Only 1 timed gait trial tolerated today due to fatigue and increased clonus of left side.     Treatment Detail Education to pt/wife re: the differences in SMILEY provided by ww , hemiwalker and quad cane and how each influence his ability to increase his use/loading of his left side.  education also on potential greater ease of using quad cane in the home for navigating around furniture, doorways, etc due to being less cumbersome.  Pt always needs to have someone with hime for gait for safety at this time so would be safe to try an AD with decreased SMILEY.  Encouragement to pt to try walking into his bathroom instead of taking w/c.  Wife would be with hime - in discussion with wife he could use right hand on the counter on the right walking in and then there is a railing on the other side that he could use on the right walking out.  Working to find more opportunities for safely increasing his functional walking at home.    Gait with  hemiwalker, quad cane and FWW x 30-50 feet each, emphasis on fwd wt shift into left stance, upright posture.  Trial of elastic strap on left side of walker to hold left hand in place instead of Gripeeze glove.  Pt states it is comfortable; he is able to pull his hand out on his own but needs assist to place his hand under the elastic.  Pt able to control walker adequately for short distances on straight paths, assist to maneuver walker needed more for turns.  Tolerated 100 feet with walker with elastic stabilizing left hand on walker.  Education to pt / wife in benefits of elastic vs. gripeeze glove and given strip of elastic to try on walker at home.  Timed gait trial - see above.     Progress Gait limited by greater fatigue today and increased tightness/clonus/inversion and supination on left foot   Education   Learner Patient;Significant Other   Readiness Acceptance   Method Explanation;Demonstration   Response Verbalizes Understanding   Education Comments as above   Plan   Plan for next session progress to use of corner of counter for left stance challenge at home.   Gait with walker with emphasis on efficiency, pattern to progress speed.   gait with quad cane for progression with stability and use in home.     Comments   Comments KX modifier - pt is requiring continued therapy beyond the  therapy cap in order to continue to improve neuromotor function for greater safety at home and community with functional mobility and reduce risk for falls and development of secondary impairments that will lead to a greater level of instability.    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

## 2017-07-14 ENCOUNTER — HOSPITAL ENCOUNTER (OUTPATIENT)
Dept: PHYSICAL THERAPY | Facility: CLINIC | Age: 67
Setting detail: THERAPIES SERIES
End: 2017-07-14
Attending: PHYSICAL MEDICINE & REHABILITATION
Payer: MEDICARE

## 2017-07-14 PROCEDURE — 40000719 ZZHC STATISTIC PT DEPARTMENT NEURO VISIT: Performed by: PHYSICAL THERAPIST

## 2017-07-14 PROCEDURE — 97112 NEUROMUSCULAR REEDUCATION: CPT | Mod: GP,KX | Performed by: PHYSICAL THERAPIST

## 2017-07-14 PROCEDURE — 97116 GAIT TRAINING THERAPY: CPT | Mod: GP,KX | Performed by: PHYSICAL THERAPIST

## 2017-07-19 ENCOUNTER — HOSPITAL ENCOUNTER (OUTPATIENT)
Dept: PHYSICAL THERAPY | Facility: CLINIC | Age: 67
Setting detail: THERAPIES SERIES
End: 2017-07-19
Attending: PHYSICAL MEDICINE & REHABILITATION
Payer: MEDICARE

## 2017-07-19 PROCEDURE — 40000719 ZZHC STATISTIC PT DEPARTMENT NEURO VISIT: Performed by: PHYSICAL THERAPIST

## 2017-07-19 PROCEDURE — 97530 THERAPEUTIC ACTIVITIES: CPT | Mod: GP,KX | Performed by: PHYSICAL THERAPIST

## 2017-07-19 PROCEDURE — 97112 NEUROMUSCULAR REEDUCATION: CPT | Mod: GP,KX | Performed by: PHYSICAL THERAPIST

## 2017-07-21 ENCOUNTER — HOSPITAL ENCOUNTER (OUTPATIENT)
Dept: PHYSICAL THERAPY | Facility: CLINIC | Age: 67
Setting detail: THERAPIES SERIES
End: 2017-07-21
Attending: PHYSICAL MEDICINE & REHABILITATION
Payer: MEDICARE

## 2017-07-21 PROCEDURE — 40000719 ZZHC STATISTIC PT DEPARTMENT NEURO VISIT: Performed by: PHYSICAL THERAPIST

## 2017-07-21 PROCEDURE — 97112 NEUROMUSCULAR REEDUCATION: CPT | Mod: GP,KX | Performed by: PHYSICAL THERAPIST

## 2017-07-25 ENCOUNTER — HOSPITAL ENCOUNTER (OUTPATIENT)
Dept: PHYSICAL THERAPY | Facility: CLINIC | Age: 67
Setting detail: THERAPIES SERIES
End: 2017-07-25
Attending: PHYSICAL MEDICINE & REHABILITATION
Payer: MEDICARE

## 2017-07-25 PROCEDURE — 97112 NEUROMUSCULAR REEDUCATION: CPT | Mod: GP,KX | Performed by: PHYSICAL THERAPIST

## 2017-07-25 PROCEDURE — 40000719 ZZHC STATISTIC PT DEPARTMENT NEURO VISIT: Performed by: PHYSICAL THERAPIST

## 2017-07-25 PROCEDURE — 97116 GAIT TRAINING THERAPY: CPT | Mod: GP,KX | Performed by: PHYSICAL THERAPIST

## 2017-07-27 ENCOUNTER — HOSPITAL ENCOUNTER (OUTPATIENT)
Dept: PHYSICAL THERAPY | Facility: CLINIC | Age: 67
Setting detail: THERAPIES SERIES
End: 2017-07-27
Attending: PHYSICAL MEDICINE & REHABILITATION
Payer: MEDICARE

## 2017-07-27 PROCEDURE — 97112 NEUROMUSCULAR REEDUCATION: CPT | Mod: GP,KX | Performed by: PHYSICAL THERAPIST

## 2017-07-27 PROCEDURE — 40000719 ZZHC STATISTIC PT DEPARTMENT NEURO VISIT: Performed by: PHYSICAL THERAPIST

## 2017-07-27 PROCEDURE — 97116 GAIT TRAINING THERAPY: CPT | Mod: GP,KX | Performed by: PHYSICAL THERAPIST

## 2017-08-01 ENCOUNTER — HOSPITAL ENCOUNTER (OUTPATIENT)
Dept: PHYSICAL THERAPY | Facility: CLINIC | Age: 67
Setting detail: THERAPIES SERIES
End: 2017-08-01
Attending: PHYSICAL MEDICINE & REHABILITATION
Payer: MEDICARE

## 2017-08-01 PROCEDURE — 97116 GAIT TRAINING THERAPY: CPT | Mod: GP,KX | Performed by: PHYSICAL THERAPIST

## 2017-08-01 PROCEDURE — 97112 NEUROMUSCULAR REEDUCATION: CPT | Mod: GP,KX | Performed by: PHYSICAL THERAPIST

## 2017-08-01 PROCEDURE — 40000719 ZZHC STATISTIC PT DEPARTMENT NEURO VISIT: Performed by: PHYSICAL THERAPIST

## 2017-08-03 ENCOUNTER — HOSPITAL ENCOUNTER (OUTPATIENT)
Dept: PHYSICAL THERAPY | Facility: CLINIC | Age: 67
Setting detail: THERAPIES SERIES
End: 2017-08-03
Attending: PHYSICAL MEDICINE & REHABILITATION
Payer: MEDICARE

## 2017-08-03 PROCEDURE — 40000719 ZZHC STATISTIC PT DEPARTMENT NEURO VISIT: Performed by: PHYSICAL THERAPIST

## 2017-08-03 PROCEDURE — 97112 NEUROMUSCULAR REEDUCATION: CPT | Mod: GP,KX | Performed by: PHYSICAL THERAPIST

## 2017-08-08 ENCOUNTER — HOSPITAL ENCOUNTER (OUTPATIENT)
Dept: PHYSICAL THERAPY | Facility: CLINIC | Age: 67
Setting detail: THERAPIES SERIES
End: 2017-08-08
Attending: PHYSICAL MEDICINE & REHABILITATION
Payer: MEDICARE

## 2017-08-08 PROCEDURE — 97116 GAIT TRAINING THERAPY: CPT | Mod: GP,KX | Performed by: PHYSICAL THERAPIST

## 2017-08-08 PROCEDURE — 97110 THERAPEUTIC EXERCISES: CPT | Mod: GP,KX | Performed by: PHYSICAL THERAPIST

## 2017-08-08 PROCEDURE — 97112 NEUROMUSCULAR REEDUCATION: CPT | Mod: GP,KX | Performed by: PHYSICAL THERAPIST

## 2017-08-08 PROCEDURE — 40000719 ZZHC STATISTIC PT DEPARTMENT NEURO VISIT: Performed by: PHYSICAL THERAPIST

## 2017-08-10 ENCOUNTER — HOSPITAL ENCOUNTER (OUTPATIENT)
Dept: PHYSICAL THERAPY | Facility: CLINIC | Age: 67
Setting detail: THERAPIES SERIES
End: 2017-08-10
Attending: PHYSICAL MEDICINE & REHABILITATION
Payer: MEDICARE

## 2017-08-10 PROCEDURE — 97116 GAIT TRAINING THERAPY: CPT | Mod: GP,KX | Performed by: PHYSICAL THERAPIST

## 2017-08-10 PROCEDURE — 97112 NEUROMUSCULAR REEDUCATION: CPT | Mod: GP,KX | Performed by: PHYSICAL THERAPIST

## 2017-08-10 PROCEDURE — 40000719 ZZHC STATISTIC PT DEPARTMENT NEURO VISIT: Performed by: PHYSICAL THERAPIST

## 2017-08-10 NOTE — PROGRESS NOTES
[facility]      OUTPATIENT PHYSICAL THERAPY  PLAN OF TREATMENT FOR OUTPATIENT REHABILITATION    Patient's Last Name, First Name, M.I.                YOB: 1950  LucianaRene  ANGELICA Shelley                        Provider's Name  [facility] Medical Record No.  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  Decrease to 1 x per week as clinically appropriate.       Goals:    Goal Identifier 3   Goal Description Gentry will be able to ambulate with a wheeled walker in 24 sec or less with CGA for safety and use of gripeeze glove to secure left hand on walker for greater safety and efficiency for functional gait in his home.    Target Date 08/07/17   Date Met      Progress: Average 36.9 sec with walker today. Improved from 41.6 sec on last testing.  Ave 30.1 with quad cane today.  Have been using walker modified with elastic band on left side to stabilize hand on walker and have easy release in event of loss of balance rather than gripeeze glove which could cause injury to left UE / shoulder if unable to release velcro.       Goal Identifier 4   Goal Description Gentry will be able to perform a home activity program with assist of his family to address his system impairments and functional limitations.   Target Date 08/07/17   Date Met   in progress   Progress:  Home activities continue to be modified and progressed.  Gentry states he works  on moving his arm and leg a lot when sitting.  Has to rely on wife or sons to be present to perform standing activities so these are not as consistent.      Goal Identifier 5   Goal Description Gentry will be able to ambulate up a flight of steps in a step-to pattern with Lillian assist and use of rail on the right side in his home environment to demonstrate improved access in his home with decreased need for chair lift.    Target Date 08/07/17   Date Met      Progress: Pt has demonstrated this in PT sessions, on set of 4 steps.  Has not done at home yet entirely - did have to descend with assist of wife and on one day when stair lift was not working.  Ended up descending backwards leading with left LE due to needing rail on right.  Wife and pt state initially they were nervous but it ended up going fine.       Goal Identifier 6   Goal Description Gentry will be able to ambulate with a quad cane distances of 100 feet with Lillian for greater mobility and access around the home environment   Target Date 08/07/17   Date Met      Progress:Navjot 100 feet well with quad cane and Lillian.  when fatigued, however,  needs greater assist/facilitation for stability.  Will continue goal for consistency.       Progress Toward Goals:   Progress this reporting period: Treatment interventions continue to focus on left LE wt bearing with proper left foot/LE alignments to help increased functional length of LE musculature, reduce spasticity and help improve neuromotor function.  With this focus of treatment, Gentry has been continually improving with his ability to achieve more midline alignment in standing which has resulted in improved left LE loading and alignment of left foot/leg.  He has made progress with all goals.  Goals are not yet fully met but remain appropriate.   Progress limited due to  spasticity and malalignment of left foot/ankle however this has been getting better now with adjustment in our treatment interventions (greater  focus on challenges with left LE loading and positions that provide stretch of tissues through larger ranges of motion, particularly gastroc-soleus).  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        Certification date from 8/8/2017 to 11/6/2017.    Genesis Walters, PT          I CERTIFY THE NEED FOR THESE SERVICES FURNISHED UNDER        THIS PLAN OF TREATMENT AND WHILE UNDER MY CARE     (Physician co-signature of this document indicates review and certification of the therapy plan).                  Referring Provider: Eddi Robles MD

## 2017-08-22 ENCOUNTER — HOSPITAL ENCOUNTER (OUTPATIENT)
Dept: PHYSICAL THERAPY | Facility: CLINIC | Age: 67
Setting detail: THERAPIES SERIES
End: 2017-08-22
Attending: PHYSICAL MEDICINE & REHABILITATION
Payer: MEDICARE

## 2017-08-22 PROCEDURE — 97116 GAIT TRAINING THERAPY: CPT | Mod: GP,KX | Performed by: PHYSICAL THERAPIST

## 2017-08-22 PROCEDURE — 40000719 ZZHC STATISTIC PT DEPARTMENT NEURO VISIT: Performed by: PHYSICAL THERAPIST

## 2017-08-22 PROCEDURE — G8978 MOBILITY CURRENT STATUS: HCPCS | Mod: GP,CM | Performed by: PHYSICAL THERAPIST

## 2017-08-22 PROCEDURE — 97112 NEUROMUSCULAR REEDUCATION: CPT | Mod: GP,KX | Performed by: PHYSICAL THERAPIST

## 2017-08-22 PROCEDURE — G8979 MOBILITY GOAL STATUS: HCPCS | Mod: GP,CL | Performed by: PHYSICAL THERAPIST

## 2017-08-24 ENCOUNTER — HOSPITAL ENCOUNTER (OUTPATIENT)
Dept: PHYSICAL THERAPY | Facility: CLINIC | Age: 67
Setting detail: THERAPIES SERIES
End: 2017-08-24
Attending: PHYSICAL MEDICINE & REHABILITATION
Payer: MEDICARE

## 2017-08-24 PROCEDURE — 97116 GAIT TRAINING THERAPY: CPT | Mod: GP,KX | Performed by: PHYSICAL THERAPIST

## 2017-08-24 PROCEDURE — 97112 NEUROMUSCULAR REEDUCATION: CPT | Mod: GP,KX | Performed by: PHYSICAL THERAPIST

## 2017-08-24 PROCEDURE — 40000719 ZZHC STATISTIC PT DEPARTMENT NEURO VISIT: Performed by: PHYSICAL THERAPIST

## 2017-08-29 ENCOUNTER — HOSPITAL ENCOUNTER (OUTPATIENT)
Dept: PHYSICAL THERAPY | Facility: CLINIC | Age: 67
Setting detail: THERAPIES SERIES
End: 2017-08-29
Attending: PHYSICAL MEDICINE & REHABILITATION
Payer: MEDICARE

## 2017-08-29 PROCEDURE — 97112 NEUROMUSCULAR REEDUCATION: CPT | Mod: GP,KX | Performed by: PHYSICAL THERAPIST

## 2017-08-29 PROCEDURE — 40000719 ZZHC STATISTIC PT DEPARTMENT NEURO VISIT: Performed by: PHYSICAL THERAPIST

## 2017-09-22 ENCOUNTER — HOSPITAL ENCOUNTER (OUTPATIENT)
Dept: PHYSICAL THERAPY | Facility: CLINIC | Age: 67
Setting detail: THERAPIES SERIES
End: 2017-09-22
Attending: PHYSICAL MEDICINE & REHABILITATION
Payer: MEDICARE

## 2017-09-22 PROCEDURE — 97110 THERAPEUTIC EXERCISES: CPT | Mod: GP,KX | Performed by: PHYSICAL THERAPIST

## 2017-09-22 PROCEDURE — 40000719 ZZHC STATISTIC PT DEPARTMENT NEURO VISIT: Performed by: PHYSICAL THERAPIST

## 2017-09-22 PROCEDURE — 97112 NEUROMUSCULAR REEDUCATION: CPT | Mod: GP,KX | Performed by: PHYSICAL THERAPIST

## 2017-09-22 PROCEDURE — 97116 GAIT TRAINING THERAPY: CPT | Mod: GP,KX | Performed by: PHYSICAL THERAPIST

## 2017-09-22 NOTE — ADDENDUM NOTE
Encounter addended by: Genesis Walters, PT on: 9/22/2017  3:31 PM<BR>     Actions taken: Pend clinical note

## 2017-09-22 NOTE — ADDENDUM NOTE
Encounter addended by: Genesis Walters, PT on: 9/22/2017  4:35 PM<BR>     Actions taken: Flowsheet data copied forward, Flowsheet accepted, Charge Capture section accepted

## 2017-09-26 ENCOUNTER — HOSPITAL ENCOUNTER (OUTPATIENT)
Dept: PHYSICAL THERAPY | Facility: CLINIC | Age: 67
Setting detail: THERAPIES SERIES
End: 2017-09-26
Attending: PHYSICAL MEDICINE & REHABILITATION
Payer: MEDICARE

## 2017-09-26 PROCEDURE — 40000719 ZZHC STATISTIC PT DEPARTMENT NEURO VISIT: Performed by: PHYSICAL THERAPIST

## 2017-09-26 PROCEDURE — 97112 NEUROMUSCULAR REEDUCATION: CPT | Mod: GP,KX | Performed by: PHYSICAL THERAPIST

## 2017-09-26 PROCEDURE — 97116 GAIT TRAINING THERAPY: CPT | Mod: GP,KX | Performed by: PHYSICAL THERAPIST

## 2017-09-28 ENCOUNTER — HOSPITAL ENCOUNTER (OUTPATIENT)
Dept: PHYSICAL THERAPY | Facility: CLINIC | Age: 67
Setting detail: THERAPIES SERIES
End: 2017-09-28
Attending: PHYSICAL MEDICINE & REHABILITATION
Payer: MEDICARE

## 2017-09-28 PROCEDURE — 40000719 ZZHC STATISTIC PT DEPARTMENT NEURO VISIT: Performed by: PHYSICAL THERAPIST

## 2017-09-28 PROCEDURE — 97112 NEUROMUSCULAR REEDUCATION: CPT | Mod: GP,KX | Performed by: PHYSICAL THERAPIST

## 2017-09-28 PROCEDURE — 97110 THERAPEUTIC EXERCISES: CPT | Mod: GP,KX | Performed by: PHYSICAL THERAPIST

## 2017-09-28 NOTE — PROGRESS NOTES
Medicare 10th visit note    Dates of Reporting period: 7/7/2017 To 8/22/2017    MD:  Eddi Robles ([facility]       08/22/17 1400   Signing Clinician's Name / Credentials   Signing clinician's name / credentials Genesis Walters PT   Session Number   Session Number 10/10 medicare   Progress Note/Recertification   Recertification Due Date 11/06/17   Mobility: Walking & Moving Around   Mobility Current Status,  (eval/re-eval & every progress note) CM: 80-99% impairment   Current Mobility Modifier Rationale 25 foot timed walk   Mobility Goal,  (eval/re-eval, every progress note, & discharge) CL: 60-79% impairment   Goal 3   Goal Identifier 3   Goal Description Gentry will be able to ambulate with a wheeled walker in 24 sec or less with CGA for safety and use of gripeeze glove to secure left hand on walker for greater safety and efficiency for functional gait in his home.    Target Date 11/06/17   Goal 4   Goal Identifier 4   Goal Description Gentry will be able to perform a home activity program with assist of his family to address his system impairments and functional limitations.   Target Date 11/06/17   Goal 5   Goal Identifier 5   Goal Description Gentry will be able to ambulate up a flight of steps in a step-to pattern with Adama assist and use of rail on the right side in his home environment to demonstrate improved access in his home with decreased need for chair lift.    Target Date 11/06/17   Goal 6   Goal Identifier 6   Goal Description Gentry will be able to ambulate with a quad cane distances of 100 feet with Adama for greater mobility and access around the home environment   Target Date 11/06/17   Subjective Report   Subjective Report Cataract surgery went well.  Has post op visit tomorrow and will get scheduled for right eye at that time.  Afraid he has lost strenght and abilities since he did not have therapy last week due to his cataract surgery.    Objective Measure 2   Objective Measure 25  foot walk   Details tested at end of session with quad cane and CGA/Adama 2 trials:  32.5 sec, 20 steps.  32.8, 20 steps.   Therapeutic Procedure/exercise   Minutes 5   Skilled Intervention facil cues, instruction for alignment.    Patient Response clonus initially in left HC.    Treatment Detail left HC stretch in standing with UE support on elevated table.     Neuromuscular Re-education   Minutes 25   Skilled Intervention Key points of control at left hip extensors, thor ext and abdominals for proper alignment and activation of left side in all planes.  cues for left heel contact with sustained extensor activaiton left LE in addition to facilitation as indicated.   LEft UE positioning for closed chain.    Patient Response fatigue    Treatment Detail sit<>stand with focus on proper anterior wt shift and sequecne of movement maintaining activation on left side.  Activities in s tanding with hands on elevated table, working on sustained hip extensor activation on left in midstance alignment and trunk in midline, also strategies to engage left UE input / stabilization on table.     Gait Training   Minutes 25   Skilled Intervention verbal cues, key points of control right obliques and left hip ext with gait.   cues, instruction education as below.  on stairs key points of control for keeping left hip forwardn and hip/knee aligned with foot descending.   Patient Response fatigue.  Better wt shfit fwd on left with cane after practice with table.    Treatment Detail gait pushing wheeled table elevated to just above waist height with 2 UE support for emphasis on shifting forward over left LE, using table as a guide to shift body fwd and maintain alignment in frontal plane before stepping with right.  assist and faicilitation for managment of table, forward shift and movement patterns, timing.  gait wih quad cane, timed gait with quad cane - see above.     Plan   Plan for next session cont left HC/ankle stretching, sustained  left hip/knee ext, cont stairs, gait speed training   Comments   Comments KX modifier - pt is requiring continued therapy beyond the  therapy cap in order to continue to improve neuromotor function for greater safety at home and community with functional mobility and reduce risk for falls and development of secondary impairments that will lead to a greater level of instability.    Total Session Time   Timed Code Treatment Minutes 55   Total Treatment Time (sum of timed and untimed services) 55     Progress this reporting period: Treatment interventions continue to focus on left LE wt bearing with proper left foot/LE alignments to help increased functional length of LE musculature, reduce spasticity and help improve neuromotor function.  With this focus of treatment, Gentry has been continually improving with his ability to achieve more midline alignment in standing which has resulted in improved left LE loading and alignment of left foot/leg.  He has made progress with all goals.  Goals are not yet fully met but remain appropriate.     Progress limited due to  spasticity and malalignment of left foot/ankle however this has been getting better now with adjustment in our treatment interventions (greater focus on challenges with left LE loading and positions that provide stretch of tissues through larger ranges of motion, particularly gastroc-soleus).  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      Continue PT 2 x per week.  Continue goals above until 11/6/17.

## 2017-09-28 NOTE — ADDENDUM NOTE
Encounter addended by: Genesis Walters, PT on: 9/28/2017  5:27 PM<BR>     Actions taken: Flowsheet data copied forward, Flowsheet accepted

## 2017-09-28 NOTE — ADDENDUM NOTE
Encounter addended by: Genesis Walters, PT on: 9/28/2017  5:21 PM<BR>     Actions taken: Flowsheet accepted

## 2017-09-28 NOTE — ADDENDUM NOTE
Encounter addended by: Genesis Walters, PT on: 9/28/2017  5:28 PM<BR>     Actions taken: Flowsheet data copied forward, Flowsheet accepted

## 2017-09-28 NOTE — ADDENDUM NOTE
Encounter addended by: Genesis Walters, PT on: 9/28/2017  5:29 PM<BR>     Actions taken: Flowsheet data copied forward, Flowsheet accepted

## 2017-09-28 NOTE — ADDENDUM NOTE
Encounter addended by: Genesis Walters, PT on: 9/28/2017  5:20 PM<BR>     Actions taken: Sign clinical note, Flowsheet accepted

## 2017-09-28 NOTE — ADDENDUM NOTE
Encounter addended by: Genesis Walters, PT on: 9/28/2017  5:26 PM<BR>     Actions taken: Flowsheet data copied forward, Pend clinical note, Sign clinical note, Flowsheet accepted

## 2017-10-04 ENCOUNTER — HOSPITAL ENCOUNTER (OUTPATIENT)
Dept: PHYSICAL THERAPY | Facility: CLINIC | Age: 67
Setting detail: THERAPIES SERIES
End: 2017-10-04
Attending: PHYSICAL MEDICINE & REHABILITATION
Payer: MEDICARE

## 2017-10-04 PROCEDURE — 97110 THERAPEUTIC EXERCISES: CPT | Mod: GP,KX | Performed by: PHYSICAL THERAPIST

## 2017-10-04 PROCEDURE — 97112 NEUROMUSCULAR REEDUCATION: CPT | Mod: GP,KX | Performed by: PHYSICAL THERAPIST

## 2017-10-04 PROCEDURE — 40000719 ZZHC STATISTIC PT DEPARTMENT NEURO VISIT: Performed by: PHYSICAL THERAPIST

## 2017-10-04 PROCEDURE — 97116 GAIT TRAINING THERAPY: CPT | Mod: GP,KX | Performed by: PHYSICAL THERAPIST

## 2017-10-10 ENCOUNTER — HOSPITAL ENCOUNTER (OUTPATIENT)
Dept: PHYSICAL THERAPY | Facility: CLINIC | Age: 67
Setting detail: THERAPIES SERIES
End: 2017-10-10
Attending: PHYSICAL MEDICINE & REHABILITATION
Payer: MEDICARE

## 2017-10-10 PROCEDURE — 97112 NEUROMUSCULAR REEDUCATION: CPT | Mod: GP,KX | Performed by: PHYSICAL THERAPIST

## 2017-10-10 PROCEDURE — 97110 THERAPEUTIC EXERCISES: CPT | Mod: GP,KX | Performed by: PHYSICAL THERAPIST

## 2017-10-10 PROCEDURE — 40000719 ZZHC STATISTIC PT DEPARTMENT NEURO VISIT: Performed by: PHYSICAL THERAPIST

## 2017-10-12 ENCOUNTER — HOSPITAL ENCOUNTER (OUTPATIENT)
Dept: PHYSICAL THERAPY | Facility: CLINIC | Age: 67
Setting detail: THERAPIES SERIES
End: 2017-10-12
Attending: PHYSICAL MEDICINE & REHABILITATION
Payer: MEDICARE

## 2017-10-12 PROCEDURE — 97116 GAIT TRAINING THERAPY: CPT | Mod: GP,KX | Performed by: PHYSICAL THERAPIST

## 2017-10-12 PROCEDURE — 40000719 ZZHC STATISTIC PT DEPARTMENT NEURO VISIT: Performed by: PHYSICAL THERAPIST

## 2017-10-12 PROCEDURE — 97112 NEUROMUSCULAR REEDUCATION: CPT | Mod: GP,KX | Performed by: PHYSICAL THERAPIST

## 2017-10-17 ENCOUNTER — HOSPITAL ENCOUNTER (OUTPATIENT)
Dept: PHYSICAL THERAPY | Facility: CLINIC | Age: 67
Setting detail: THERAPIES SERIES
End: 2017-10-17
Attending: PHYSICAL MEDICINE & REHABILITATION
Payer: MEDICARE

## 2017-10-17 PROCEDURE — 97116 GAIT TRAINING THERAPY: CPT | Mod: GP,KX | Performed by: PHYSICAL THERAPIST

## 2017-10-17 PROCEDURE — 97112 NEUROMUSCULAR REEDUCATION: CPT | Mod: GP,KX | Performed by: PHYSICAL THERAPIST

## 2017-10-17 PROCEDURE — 40000719 ZZHC STATISTIC PT DEPARTMENT NEURO VISIT: Performed by: PHYSICAL THERAPIST

## 2017-10-24 ENCOUNTER — HOSPITAL ENCOUNTER (OUTPATIENT)
Dept: PHYSICAL THERAPY | Facility: CLINIC | Age: 67
Setting detail: THERAPIES SERIES
End: 2017-10-24
Attending: PHYSICAL MEDICINE & REHABILITATION
Payer: MEDICARE

## 2017-10-24 PROCEDURE — 97110 THERAPEUTIC EXERCISES: CPT | Mod: GP,KX | Performed by: PHYSICAL THERAPIST

## 2017-10-24 PROCEDURE — 40000719 ZZHC STATISTIC PT DEPARTMENT NEURO VISIT: Performed by: PHYSICAL THERAPIST

## 2017-10-24 PROCEDURE — 97116 GAIT TRAINING THERAPY: CPT | Mod: GP,KX | Performed by: PHYSICAL THERAPIST

## 2017-10-24 PROCEDURE — 97112 NEUROMUSCULAR REEDUCATION: CPT | Mod: GP,KX | Performed by: PHYSICAL THERAPIST

## 2017-10-24 PROCEDURE — G8978 MOBILITY CURRENT STATUS: HCPCS | Mod: GP,CL | Performed by: PHYSICAL THERAPIST

## 2017-10-24 PROCEDURE — G8979 MOBILITY GOAL STATUS: HCPCS | Mod: GP,CL | Performed by: PHYSICAL THERAPIST

## 2017-10-24 NOTE — PROGRESS NOTES
10th Visit Physical Therapy Progress Note    Dates:  8/23/2017 - 10/24/2017  MD: Eddi Robles ([facility])       10/24/17 1500   Signing Clinician's Name / Credentials   Signing clinician's name / credentials Genesis Walters PT   Session Number   Session Number 10/10 medicare   Progress Note/Recertification   Progress Note Completed Date 10/24/17  (10th visit note)   Recertification Due Date 11/06/17   Mobility: Walking & Moving Around   Mobility Current Status,  (eval/re-eval & every progress note) CL: 60-79% impairment   Current Mobility Modifier Rationale 25 foot timed walk   Mobility Goal,  (eval/re-eval, every progress note, & discharge) CL: 60-79% impairment   Goal 3   Goal Identifier 3   Goal Description Gentry will be able to ambulate with a wheeled walker in 24 sec or less with CGA for safety and use of gripeeze glove to secure left hand on walker for greater safety and efficiency for functional gait in his home.    Target Date 11/06/17   Goal 4   Goal Identifier 4   Goal Description Gentry will be able to perform a home activity program with assist of his family to address his system impairments and functional limitations.   Target Date 11/06/17   Goal 5   Goal Identifier 5   Goal Description Gnetry will be able to ambulate up a flight of steps in a step-to pattern with Adama assist and use of rail on the right side in his home environment to demonstrate improved access in his home with decreased need for chair lift.    Target Date 11/06/17   Goal 6   Goal Identifier 6   Goal Description Gentry will be able to ambulate with a quad cane distances of 100 feet with Adama for greater mobility and access around the home environment   Target Date 11/06/17   Subjective Report   Subjective Report Had a colonoscopy on Monday.  went ok but slept much of the day afterward.     Objective Measure 2   Objective Measure 25 foot walk   Details at beginning of session with quad cane - 2 trials, 1 - 32.3 sec, 22  "steps.  2 - 29.4 sec, 21 steps.  At end of session - 2 trials with quad cane : 1 - 24.5 sec, 21 steps.  2 - 25.5, 20 steps.    (27.9 sec average for all 4 trials)   Objective Measure 3   Objective Measure w/c<>mat   Details to right from w/c to mat and mat to w/c - able to transfer with SBA with only cues for techique prior to transfer.   Therapeutic Procedure/exercise   Minutes 10   Skilled Intervention set up, assist, facil.   Patient Response no c/o.   Treatment Detail seated trunk rotation to right with facil for left hand position, cues an facil for upright posture and left scap add/dep during stretch. 2 x 30\" .  Standing HC stretch with 2 UE support on table - facil for alignment of trunk, pelvis and LEs.     Progress better able to extend knee to get heel flat to floor.     Neuromuscular Re-education   Minutes 35   Skilled Intervention cues, instruciton, facil for left foot position, alignment of pelvis in frontal plane to assist wth tone inhibition and alignment of left foot/ankle into neutral.  Key points of control for left hip ext, trunk coactivaiton , left ankle pronation, facil and cues for left wt shift.    Patient Response intermittent fatigue, brief rest to regroup, then able to resume.    Treatment Detail seated trunk coactivation with thor ext in upright with 2 UE closed chain at sides, progression to variable degrees of anterior wt shift of trunk and reaching fwd, upward with right UE. Sit<>lift off combined with reaching activity with right UE, left UE closed chain for loading/activation - emphasis on sustained thor ext and forward eye gaze with transition fwd to bring COM over SMILEY. progression to activities in standing for left LE loading wiht slight ankle DF position - activities in full midstance position with increased demand on left LE for sustained hip/knee ext with coactive upright trunk adn bilateral UEs in closed chain on table in front.    Progress better activation of right LE " extensors in stance today.  - sustaining longer    Gait Training   Minutes 10   Skilled Intervention Lillian provided for safety during timed gait trials but no facilitaiton for movement patterns.     Patient Response fatigue, a little winded.    Treatment Detail timed gait trials - see objective measures.    Progress improved left stance and left LE alignment as well as step length symmetry  at end of session after therapy activities/stretching.    Plan   Plan for next session stairs. cont left HC/ankle stretching, sustained left hip/knee ext, cont stairs, gait speed training   Comments   Comments KX modifier - pt is requiring continued therapy beyond the  therapy cap in order to continue to improve neuromotor function for greater safety at home and community with functional mobility and reduce risk for falls and development of secondary impairments that will lead to a greater level of instability.    Total Session Time   Timed Code Treatment Minutes 55   Total Treatment Time (sum of timed and untimed services) 55     Progress to goals:  Treatment interventions have been focusing more on improving stability with gait using quad cane as opposed to walker.  Patient and wife feel the cane may be easier and less cumbersome  to navigate in smaller areas in the home.  He also continues to have difficulty with maintaining his left hand on the walker.  The gripeeze glove has been determined to be too much of a potential injury hazard with pt not being able to release his left hand if he were to lose his balance.  Trial of strong elastic band attached to left handle that pt's hand could easily slip out of if necessary was trialed in the therapy environment with fairly good results.  When tried at home, however, it was not effective.  It was then determined it would be best to try to increase stability and gait safety with use of quad cane in the home environment.  Gentry has been able to progress to walking 25 feet in 27.9 sec  with the quad cane and Adama.      Treatment interventions continue to focus on left LE wt bearing with proper left foot/LE alignments to help increased functional length of LE musculature, reduce spasticity and help improve neuromotor function.  With this focus of treatment, Gentry has been continually improving with his ability to achieve more midline alignment in standing which has resulted in improved left LE loading and alignment of left foot/leg.  He has made progress with all goals.  Goals are not yet fully met but remain appropriate.     Progress limited by: spasticity and malalignment of left foot/ankle however this has been getting better now with adjustment in our treatment interventions (greater focus on challenges with left LE loading and positions that provide stretch of tissues through larger ranges of motion, particularly gastroc-soleus).  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      Plan: Continue 2 x per week.  Goals above remain valid until 11/6/17.

## 2017-10-26 ENCOUNTER — HOSPITAL ENCOUNTER (OUTPATIENT)
Dept: PHYSICAL THERAPY | Facility: CLINIC | Age: 67
Setting detail: THERAPIES SERIES
End: 2017-10-26
Attending: PHYSICAL MEDICINE & REHABILITATION
Payer: MEDICARE

## 2017-10-26 PROCEDURE — 40000719 ZZHC STATISTIC PT DEPARTMENT NEURO VISIT: Performed by: PHYSICAL THERAPIST

## 2017-10-26 PROCEDURE — 97112 NEUROMUSCULAR REEDUCATION: CPT | Mod: GP,KX | Performed by: PHYSICAL THERAPIST

## 2017-10-31 ENCOUNTER — HOSPITAL ENCOUNTER (OUTPATIENT)
Dept: PHYSICAL THERAPY | Facility: CLINIC | Age: 67
Setting detail: THERAPIES SERIES
End: 2017-10-31
Attending: PHYSICAL MEDICINE & REHABILITATION
Payer: MEDICARE

## 2017-10-31 PROCEDURE — 97116 GAIT TRAINING THERAPY: CPT | Mod: GP,KX | Performed by: PHYSICAL THERAPIST

## 2017-10-31 PROCEDURE — 97112 NEUROMUSCULAR REEDUCATION: CPT | Mod: GP,KX | Performed by: PHYSICAL THERAPIST

## 2017-10-31 PROCEDURE — 40000719 ZZHC STATISTIC PT DEPARTMENT NEURO VISIT: Performed by: PHYSICAL THERAPIST

## 2017-11-02 ENCOUNTER — HOSPITAL ENCOUNTER (OUTPATIENT)
Dept: PHYSICAL THERAPY | Facility: CLINIC | Age: 67
Setting detail: THERAPIES SERIES
End: 2017-11-02
Attending: PHYSICAL MEDICINE & REHABILITATION
Payer: MEDICARE

## 2017-11-02 PROCEDURE — 97116 GAIT TRAINING THERAPY: CPT | Mod: GP,KX | Performed by: PHYSICAL THERAPIST

## 2017-11-02 PROCEDURE — 40000719 ZZHC STATISTIC PT DEPARTMENT NEURO VISIT: Performed by: PHYSICAL THERAPIST

## 2017-11-02 PROCEDURE — 97112 NEUROMUSCULAR REEDUCATION: CPT | Mod: GP,KX | Performed by: PHYSICAL THERAPIST

## 2017-11-09 ENCOUNTER — HOSPITAL ENCOUNTER (OUTPATIENT)
Dept: PHYSICAL THERAPY | Facility: CLINIC | Age: 67
Setting detail: THERAPIES SERIES
End: 2017-11-09
Attending: PHYSICAL MEDICINE & REHABILITATION
Payer: MEDICARE

## 2017-11-09 PROCEDURE — 40000719 ZZHC STATISTIC PT DEPARTMENT NEURO VISIT: Performed by: PHYSICAL THERAPIST

## 2017-11-09 PROCEDURE — 97112 NEUROMUSCULAR REEDUCATION: CPT | Mod: GP,KX | Performed by: PHYSICAL THERAPIST

## 2017-11-09 PROCEDURE — G8978 MOBILITY CURRENT STATUS: HCPCS | Mod: GP,CL | Performed by: PHYSICAL THERAPIST

## 2017-11-09 PROCEDURE — 97116 GAIT TRAINING THERAPY: CPT | Mod: GP,KX | Performed by: PHYSICAL THERAPIST

## 2017-11-09 PROCEDURE — G8979 MOBILITY GOAL STATUS: HCPCS | Mod: GP,CL | Performed by: PHYSICAL THERAPIST

## 2017-11-14 ENCOUNTER — HOSPITAL ENCOUNTER (OUTPATIENT)
Dept: PHYSICAL THERAPY | Facility: CLINIC | Age: 67
Setting detail: THERAPIES SERIES
End: 2017-11-14
Attending: PHYSICAL MEDICINE & REHABILITATION
Payer: MEDICARE

## 2017-11-14 PROCEDURE — 97110 THERAPEUTIC EXERCISES: CPT | Mod: GP,KX | Performed by: PHYSICAL THERAPIST

## 2017-11-14 PROCEDURE — 97112 NEUROMUSCULAR REEDUCATION: CPT | Mod: GP,KX | Performed by: PHYSICAL THERAPIST

## 2017-11-14 PROCEDURE — 40000719 ZZHC STATISTIC PT DEPARTMENT NEURO VISIT: Performed by: PHYSICAL THERAPIST

## 2017-11-16 ENCOUNTER — HOSPITAL ENCOUNTER (OUTPATIENT)
Dept: PHYSICAL THERAPY | Facility: CLINIC | Age: 67
Setting detail: THERAPIES SERIES
End: 2017-11-16
Attending: PHYSICAL MEDICINE & REHABILITATION
Payer: MEDICARE

## 2017-11-16 PROCEDURE — 97116 GAIT TRAINING THERAPY: CPT | Mod: GP,KX | Performed by: PHYSICAL THERAPIST

## 2017-11-16 PROCEDURE — 97110 THERAPEUTIC EXERCISES: CPT | Mod: GP,KX | Performed by: PHYSICAL THERAPIST

## 2017-11-16 PROCEDURE — 40000719 ZZHC STATISTIC PT DEPARTMENT NEURO VISIT: Performed by: PHYSICAL THERAPIST

## 2017-11-16 PROCEDURE — 97112 NEUROMUSCULAR REEDUCATION: CPT | Mod: GP,KX | Performed by: PHYSICAL THERAPIST

## 2017-11-21 ENCOUNTER — HOSPITAL ENCOUNTER (OUTPATIENT)
Dept: PHYSICAL THERAPY | Facility: CLINIC | Age: 67
Setting detail: THERAPIES SERIES
End: 2017-11-21
Attending: PHYSICAL MEDICINE & REHABILITATION
Payer: MEDICARE

## 2017-11-21 PROCEDURE — 97116 GAIT TRAINING THERAPY: CPT | Mod: GP,KX | Performed by: PHYSICAL THERAPIST

## 2017-11-21 PROCEDURE — 97112 NEUROMUSCULAR REEDUCATION: CPT | Mod: GP,KX | Performed by: PHYSICAL THERAPIST

## 2017-11-21 PROCEDURE — 40000719 ZZHC STATISTIC PT DEPARTMENT NEURO VISIT: Performed by: PHYSICAL THERAPIST

## 2017-11-29 ENCOUNTER — HOSPITAL ENCOUNTER (OUTPATIENT)
Dept: PHYSICAL THERAPY | Facility: CLINIC | Age: 67
Setting detail: THERAPIES SERIES
End: 2017-11-29
Attending: PHYSICAL MEDICINE & REHABILITATION
Payer: MEDICARE

## 2017-11-29 PROCEDURE — 97112 NEUROMUSCULAR REEDUCATION: CPT | Mod: GP | Performed by: PHYSICAL THERAPIST

## 2017-11-29 PROCEDURE — 40000719 ZZHC STATISTIC PT DEPARTMENT NEURO VISIT: Performed by: PHYSICAL THERAPIST

## 2017-11-29 PROCEDURE — 97116 GAIT TRAINING THERAPY: CPT | Mod: GP | Performed by: PHYSICAL THERAPIST

## 2017-12-06 ENCOUNTER — HOSPITAL ENCOUNTER (OUTPATIENT)
Dept: PHYSICAL THERAPY | Facility: CLINIC | Age: 67
Setting detail: THERAPIES SERIES
End: 2017-12-06
Attending: PHYSICAL MEDICINE & REHABILITATION
Payer: MEDICARE

## 2017-12-06 PROCEDURE — 97116 GAIT TRAINING THERAPY: CPT | Mod: GP | Performed by: PHYSICAL THERAPIST

## 2017-12-06 PROCEDURE — 40000719 ZZHC STATISTIC PT DEPARTMENT NEURO VISIT: Performed by: PHYSICAL THERAPIST

## 2017-12-06 PROCEDURE — 97112 NEUROMUSCULAR REEDUCATION: CPT | Mod: GP,KX | Performed by: PHYSICAL THERAPIST

## 2017-12-07 NOTE — ADDENDUM NOTE
Encounter addended by: Genesis Walters, PT on: 12/7/2017  4:00 PM<BR>     Actions taken: Sign clinical note

## 2017-12-12 ENCOUNTER — HOSPITAL ENCOUNTER (OUTPATIENT)
Dept: PHYSICAL THERAPY | Facility: CLINIC | Age: 67
Setting detail: THERAPIES SERIES
End: 2017-12-12
Attending: PHYSICAL MEDICINE & REHABILITATION
Payer: MEDICARE

## 2017-12-12 PROCEDURE — G8978 MOBILITY CURRENT STATUS: HCPCS | Mod: GP,CL | Performed by: PHYSICAL THERAPIST

## 2017-12-12 PROCEDURE — 97112 NEUROMUSCULAR REEDUCATION: CPT | Mod: GP,KX | Performed by: PHYSICAL THERAPIST

## 2017-12-12 PROCEDURE — 40000719 ZZHC STATISTIC PT DEPARTMENT NEURO VISIT: Performed by: PHYSICAL THERAPIST

## 2017-12-12 PROCEDURE — G8979 MOBILITY GOAL STATUS: HCPCS | Mod: GP,CL | Performed by: PHYSICAL THERAPIST

## 2017-12-12 PROCEDURE — 97116 GAIT TRAINING THERAPY: CPT | Mod: GP,KX | Performed by: PHYSICAL THERAPIST

## 2017-12-14 ENCOUNTER — HOSPITAL ENCOUNTER (OUTPATIENT)
Dept: PHYSICAL THERAPY | Facility: CLINIC | Age: 67
Setting detail: THERAPIES SERIES
End: 2017-12-14
Attending: PHYSICAL MEDICINE & REHABILITATION
Payer: MEDICARE

## 2017-12-14 PROCEDURE — 40000719 ZZHC STATISTIC PT DEPARTMENT NEURO VISIT: Performed by: PHYSICAL THERAPIST

## 2017-12-14 PROCEDURE — 97112 NEUROMUSCULAR REEDUCATION: CPT | Mod: GP,KX | Performed by: PHYSICAL THERAPIST

## 2017-12-14 PROCEDURE — 97116 GAIT TRAINING THERAPY: CPT | Mod: GP,KX | Performed by: PHYSICAL THERAPIST

## 2017-12-19 ENCOUNTER — HOSPITAL ENCOUNTER (OUTPATIENT)
Dept: PHYSICAL THERAPY | Facility: CLINIC | Age: 67
Setting detail: THERAPIES SERIES
End: 2017-12-19
Attending: PHYSICAL MEDICINE & REHABILITATION
Payer: MEDICARE

## 2017-12-19 PROCEDURE — 97112 NEUROMUSCULAR REEDUCATION: CPT | Mod: GP,KX | Performed by: PHYSICAL THERAPIST

## 2017-12-19 PROCEDURE — 40000719 ZZHC STATISTIC PT DEPARTMENT NEURO VISIT: Performed by: PHYSICAL THERAPIST

## 2017-12-19 PROCEDURE — 97110 THERAPEUTIC EXERCISES: CPT | Mod: GP,KX | Performed by: PHYSICAL THERAPIST

## 2017-12-19 PROCEDURE — 97116 GAIT TRAINING THERAPY: CPT | Mod: GP,KX | Performed by: PHYSICAL THERAPIST

## 2017-12-20 ENCOUNTER — HOSPITAL ENCOUNTER (OUTPATIENT)
Dept: PHYSICAL THERAPY | Facility: CLINIC | Age: 67
Setting detail: THERAPIES SERIES
End: 2017-12-20
Attending: PHYSICAL MEDICINE & REHABILITATION
Payer: MEDICARE

## 2017-12-20 PROCEDURE — 97112 NEUROMUSCULAR REEDUCATION: CPT | Mod: GP,KX | Performed by: PHYSICAL THERAPIST

## 2017-12-20 PROCEDURE — 97116 GAIT TRAINING THERAPY: CPT | Mod: GP,KX | Performed by: PHYSICAL THERAPIST

## 2017-12-20 PROCEDURE — 40000719 ZZHC STATISTIC PT DEPARTMENT NEURO VISIT: Performed by: PHYSICAL THERAPIST

## 2017-12-28 ENCOUNTER — HOSPITAL ENCOUNTER (OUTPATIENT)
Dept: PHYSICAL THERAPY | Facility: CLINIC | Age: 67
Setting detail: THERAPIES SERIES
End: 2017-12-28
Attending: PHYSICAL MEDICINE & REHABILITATION
Payer: MEDICARE

## 2017-12-28 PROCEDURE — 97112 NEUROMUSCULAR REEDUCATION: CPT | Mod: GP,KX | Performed by: PHYSICAL THERAPIST

## 2017-12-28 PROCEDURE — 40000719 ZZHC STATISTIC PT DEPARTMENT NEURO VISIT: Performed by: PHYSICAL THERAPIST

## 2017-12-28 PROCEDURE — G8979 MOBILITY GOAL STATUS: HCPCS | Mod: GP,CL | Performed by: PHYSICAL THERAPIST

## 2017-12-28 PROCEDURE — G8980 MOBILITY D/C STATUS: HCPCS | Mod: GP,CL | Performed by: PHYSICAL THERAPIST

## 2017-12-28 PROCEDURE — 97116 GAIT TRAINING THERAPY: CPT | Mod: GP,KX | Performed by: PHYSICAL THERAPIST

## 2018-02-02 NOTE — ADDENDUM NOTE
Encounter addended by: Genesis Walters, PT on: 2/2/2018 12:42 PM<BR>     Actions taken: Flowsheet accepted

## 2018-02-02 NOTE — ADDENDUM NOTE
Encounter addended by: Genesis Walters, PT on: 2/2/2018 12:49 PM<BR>     Actions taken: Flowsheet accepted

## 2018-02-02 NOTE — ADDENDUM NOTE
Encounter addended by: Genesis Walters, PT on: 2/2/2018 12:45 PM<BR>     Actions taken: Flowsheet accepted

## 2018-02-02 NOTE — ADDENDUM NOTE
Encounter addended by: Genesis Walters, PT on: 2/2/2018 12:37 PM<BR>     Actions taken: Flowsheet accepted

## 2018-02-02 NOTE — ADDENDUM NOTE
Encounter addended by: Genesis Walters, PT on: 2/2/2018 12:39 PM<BR>     Actions taken: Flowsheet accepted

## 2018-02-02 NOTE — PROGRESS NOTES
Outpatient Physical Therapy Discharge Note     Patient: Rene Chowdhury  : 1950    Beginning/End Dates of Reporting Period:  2017 to 2017    Referring Provider: Eddi Robles ([facility])    Therapy Diagnosis: dependence with mobility and self cares due to L hemiplegia and neglect     Client Self Report: States that he will be moving into an AL facility,   He will be moving in tomorrow and Karen will be d/c from [facility] next Thursday and will go there as well.  OT has been there yesterday (to help with set up), facility has a  and fitness area.  Pt states that he knows what he should be doing as far as HEP    Objective Measurements:     Objective Measure: 25 foot walk  Details: with quad cane and Adama 31.3 sec, 25 steps.   with FWW and assist for L hand placement  45 seconds.          Goals:    Goal Identifier 3   Goal Description Gentry will be able to ambulate with a wheeled walker in 24 sec or less with CGA for safety and use of gripeeze glove to secure left hand on walker for greater safety and efficiency for functional gait in his home.    Target Date 18   Date Met   not fully met   Progress:  Testing today pt performed 25 foot walk in 31.3 seconds and 27 steps.  Has been able to ambulate at faster speed (completing it in 27.9 sec) after motor patterns have been practiced.  Variability in left LE spasticity also influences gait speed from day to day.      Goal Identifier 4   Goal Description Gentry will be able to perform a home activity program with assist of his family to address his system impairments and functional limitations.   Target Date 18   Date Met   17  Met for program to date.     Progress:  Home activities continue to be modified and progressed.  Gentry states he tries to stand at counter and reach up to cupboards.  He also tries to move his arm and leg a lot when sitting in his w/c.      Goal Identifier 5   Goal Description Gentry will be  able to ambulate up a flight of steps in a step-to pattern with Adama assist and use of rail on the right side in his home environment to demonstrate improved access in his home with decreased need for chair lift.    Target Date 02/05/18   Date Met   partially met   Progress:  Gentry has demonstrated negotiation of stairs in step-to pattern in the clinic environment.  At home, however, he and his wife are nervous about trying this on their own.  Recommendations to have son also assist/stand by as needed but pt has not felt confident in trying this at home.     Goal Identifier 6   Goal Description Gentry will be able to ambulate with a quad cane distances of 100 feet with Adama for greater mobility and access around the home environment   Target Date 02/05/18   Date Met   partially met   Progress:  Gentry has walked up to 110 feet with minimal to moderate facilitation for safety and postural control. Wife purchased a cane to use at home at the end of November and were starting to trial it for short distances, such as in and out of the bathroom.        Progress Toward Goals:   Progress this reporting period: Treatment interventions have been focusing more on improving stability with gait using quad cane as opposed to walker.  Patient and wife felt the cane may be easier and less cumbersome to navigate in smaller areas in the home.  He also continues to have difficulty with maintaining his left hand on the walker.  THey now have a quad cane for home and had been trialing it for short distances.  Since wife has fallen ill and has been in the hospital Gentry has not been doing as much walking in the household.  He is now transitioning to assisted living so hopefully will be able to progress with safety and efficiency with this with the care and therapy he will receive there.        Progress limited due to spasticity and malalignment of left foot/ankle however this has been getting better now with adjustment in our treatment  interventions (greater focus on challenges with left LE loading and positions that provide stretch of tissues through larger ranges of motion, particularly gastroc-soleus).  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      Plan:  Discharge from therapy.    Discharge:    Reason for Discharge: Patient chooses to discontinue therapy due to transition to assisted living facility.  Apparently patient will be participating in home PT/OT there.      Equipment Issued: none    Discharge Plan: Patient to continue home program as able within AL facility.

## 2018-02-02 NOTE — ADDENDUM NOTE
Encounter addended by: Genesis Walters, PT on: 2/2/2018 12:46 PM<BR>     Actions taken: Flowsheet accepted

## 2018-02-02 NOTE — ADDENDUM NOTE
Encounter addended by: Genesis Walters, PT on: 2/2/2018 12:51 PM<BR>     Actions taken: Flowsheet accepted

## 2018-02-02 NOTE — ADDENDUM NOTE
Encounter addended by: Genesis Walters, PT on: 2/2/2018 12:48 PM<BR>     Actions taken: Flowsheet data copied forward, Flowsheet accepted, Charge Capture section accepted

## 2018-02-02 NOTE — ADDENDUM NOTE
Encounter addended by: Genesis Walters, PT on: 2/2/2018  1:02 PM<BR>     Actions taken: Sign clinical note, Episode resolved

## 2018-02-02 NOTE — ADDENDUM NOTE
Encounter addended by: Genesis Walters, PT on: 2/2/2018 12:35 PM<BR>     Actions taken: Sign clinical note, Flowsheet accepted

## 2018-02-02 NOTE — ADDENDUM NOTE
Encounter addended by: Genesis Walters, PT on: 2/2/2018 12:38 PM<BR>     Actions taken: Flowsheet accepted

## 2018-02-02 NOTE — ADDENDUM NOTE
Encounter addended by: Genesis Walters, PT on: 2/2/2018 12:48 PM<BR>     Actions taken: Flowsheet accepted

## 2018-02-02 NOTE — ADDENDUM NOTE
Encounter addended by: Genesis Walters, PT on: 2/2/2018 12:43 PM<BR>     Actions taken: Flowsheet accepted

## 2018-02-02 NOTE — ADDENDUM NOTE
Encounter addended by: Genesis Walters, PT on: 2/2/2018 12:44 PM<BR>     Actions taken: Flowsheet accepted

## 2018-02-02 NOTE — PROGRESS NOTES
[facility]      OUTPATIENT PHYSICAL THERAPY  PLAN OF TREATMENT FOR OUTPATIENT REHABILITATION    Patient's Last Name, First Name, M.I.                YOB: 1950  LucianaReneasad DOMINGUEZ Jr                        Provider's Name  [facility] Medical Record No.  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  Decrease to 1 x per week as clinically appropriate.       Goals:    Goal Identifier 3   Goal Description Gentry will be able to ambulate with a wheeled walker in 24 sec or less with CGA for safety and use of gripeeze glove to secure left hand on walker for greater safety and efficiency for functional gait in his home.    Target Date 11/06/17   Date Met   in progress   Progress:  Testing today pt performed 25 foot walk in 31.3 seconds and 27 steps.  Has been able to ambulate at faster speed (completing it in 27.9 sec) after motor patterns have been practiced.  Variability in left LE spasticity also influences gait speed from day to day.      Goal Identifier 4   Goal Description Gentry will be able to perform a home activity program with assist of his family to address his system impairments and functional limitations.   Target Date 11/06/17   Date Met      Progress:  Home activities continue to be modified and progressed.  Gentry states he tries to stand at counter and reach up to cupboards.  He also tries to move his arm and leg a lot  when sitting in his w/c.      Goal Identifier 5   Goal Description Gentry will be able to ambulate up a flight of steps in a step-to pattern with Adama assist and use of rail on the right side in his home environment to demonstrate improved access in his home with decreased need for chair lift.    Target Date 11/06/17   Date Met   partially met   Progress:  Gentry has demonstrated negotiation of stairs in step-to pattern in the clinic environment.  At home, however, he and his wife are nervous about trying this on their own.  Recommendations to have son also assist/stand by as needed but pt has not felt confident in trying this at home.      Goal Identifier 6   Goal Description Gentry will be able to ambulate with a quad cane distances of 100 feet with Adama for greater mobility and access around the home environment   Target Date 11/06/17   Date Met   in progress.     Progress:  Gentry has walked up to 110 feet with minimal to moderate facilitation for safety and postural control.  .       Progress Toward Goals:   See above    Progress limited due to spasticity and malalignment of left foot/ankle however this has been getting better now with adjustment in our treatment interventions (greater focus on challenges with left LE loading and positions that provide stretch of tissues through larger ranges of motion, particularly gastroc-soleus).  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      Certification date from 11/7/2017 to 2/5/2018.    Genesis Walters, PT          I CERTIFY THE NEED FOR THESE SERVICES FURNISHED UNDER        THIS PLAN OF TREATMENT AND WHILE UNDER MY CARE     (Physician co-signature of this document indicates review and certification of the therapy plan).                Referring Provider: Eddi Robles MD

## 2018-03-13 ENCOUNTER — OFFICE VISIT (OUTPATIENT)
Dept: UROLOGY | Facility: CLINIC | Age: 68
End: 2018-03-13
Payer: MEDICARE

## 2018-03-13 VITALS — HEART RATE: 72 BPM | BODY MASS INDEX: 30.78 KG/M2 | WEIGHT: 215 LBS | OXYGEN SATURATION: 96 % | HEIGHT: 70 IN

## 2018-03-13 DIAGNOSIS — R41.4 LEFT-SIDED NEGLECT: ICD-10-CM

## 2018-03-13 DIAGNOSIS — N47.1 PHIMOSIS: Primary | ICD-10-CM

## 2018-03-13 DIAGNOSIS — G81.12: ICD-10-CM

## 2018-03-13 DIAGNOSIS — I67.9: ICD-10-CM

## 2018-03-13 PROCEDURE — 99203 OFFICE O/P NEW LOW 30 MIN: CPT | Performed by: UROLOGY

## 2018-03-13 ASSESSMENT — PAIN SCALES - GENERAL: PAINLEVEL: NO PAIN (0)

## 2018-03-13 NOTE — NURSING NOTE
Pt sometimes uses the ointment.  Pt lives at an assisted living home.  Pt sometimes has seizures.    TAMIKO Francois, CMA

## 2018-03-13 NOTE — PROGRESS NOTES
Chief Complaint:    Phimosis         Consult or Referral:     Mr. Rene Chowdhury is a 67 year old male seen in consultation from Dr. Sow.         History of Present Illness:   Rene Chowdhury is a very pleasant 67 year old male who presents with a history of phimosis. Patient states this has been chronic problem over past several months and has not been terribly bothersome to him. Had stroke with subsequent left hemiparesis in 2013. More recently was placed in nursing facility. Since that time, he has been noted to have phimosis with difficulties retracting foreskin Concerned from CNA regarding this issue. He has not had redness, bleeding, pain, infection, or difficulty voiding. Is otherwise asymptomatic.         Past Medical History:     Past Medical History:   Diagnosis Date     Altered mental status      Anxiety      Brain bleed (H)     nov 7/2013     Constipation      Depressive disorder      Encephalopathy      Gastro-oesophageal reflux disease      Hemiparesis affecting left side as late effect of stroke (H)      History of thrombophlebitis      Hypertension      Insomnia      Mumps      Obstructive sleep apnea      Pneumonia      Respiratory failure (H)      Sleep apnea      Unspecified cerebral artery occlusion with cerebral infarction      Weight loss           Past Surgical History:     Past Surgical History:   Procedure Laterality Date     CRANIOTOMY, EVACUATE HEMATOMA SUBDURAL, COMBINED  11/7/2013    Procedure: COMBINED CRANIOTOMY, EVACUATE HEMATOMA SUBDURAL;  Craniotomy, Right Frontal-Tempoparietal for Hematoma;  Surgeon: Carlos Eduardo Yanez MD;  Location:  OR     LAPAROSCOPIC ASSISTED INSERTION TUBE GASTROTOMY  11/18/2013    Procedure: LAPAROSCOPIC ASSISTED INSERTION TUBE GASTROSTOMY;  LAPAROSCOPIC GASTROSTOMY TUBE PLACEMENT;  Surgeon: Jim Turk MD;  Location:  OR          Medications     Current Outpatient Prescriptions   Medication     menthol-zinc oxide  "(CALMOSEPTINE) 0.44-20.625 % OINT     labetalol (NORMODYNE) 200 MG tablet     lamoTRIgine (LAMICTAL) 100 MG tablet     hydrALAZINE (APRESOLINE) 10 MG tablet     levETIRAcetam (KEPPRA) 500 MG tablet     senna-docusate (SENNA S) 8.6-50 MG per tablet     Magnesium Hydroxide (MILK OF MAGNESIA PO)     traMADol (ULTRAM) 50 MG tablet     traZODone (DESYREL) 100 MG tablet     senna-docusate (SENNA S) 8.6-50 MG per tablet     acetaminophen (TYLENOL) 160 MG/5ML suspension     miconazole (MICATIN; MICRO GUARD) 2 % powder     multivitamins with minerals (CERTAVITE) LIQD     pantoprazole (PROTONIX) 2 mg/mL     No current facility-administered medications for this visit.           Family History:   History reviewed. No pertinent family history.         Social History:     Social History     Social History     Marital status:      Spouse name: N/A     Number of children: N/A     Years of education: N/A     Occupational History     Not on file.     Social History Main Topics     Smoking status: Never Smoker     Smokeless tobacco: Never Used     Alcohol use No     Drug use: No     Sexual activity: Not on file     Other Topics Concern     Not on file     Social History Narrative   Worked in aircraft maintenance         Allergies:   Baclofen         Review of Systems:  From intake questionnaire     Negative 14 system review except as noted on HPI, nurse's note.         Physical Exam:     Patient is a 67 year old  male   Vitals: Pulse 72, height 1.778 m (5' 10\"), weight 97.5 kg (215 lb), SpO2 96 %.  General Appearance Adult: Body mass index is 30.85 kg/(m^2).  Alert, no acute distress, oriented; in wheelchair secondary to previous stroke  HENT: throat/mouth:normal, good dentition  Lungs: no respiratory distress, or pursed lip breathing  Heart: No obvious jugular venous distension present  Abdomen: obese, soft, nontender, no organomegaly or masses,   Musculoskeltal: extremities normal, no peripheral edema; left hemiparesis  Skin: " no suspicious lesions or rashes  Neuro: Alert, oriented, speech and mentation normal; left hemiparesis  Psych: affect and mood normal  Gait: Normal  : penis uncircumcised. Phimosis noted. Unable to retract foreskin to visualize glans. No discharge or erythema. No evidence of infection.       Labs and Pathology:    I reviewed all applicable laboratory and pathology data and went over findings with patient  Significant for   Lab Results   Component Value Date    CR 1.27 03/20/2015    CR 1.30 04/14/2014    CR 1.05 03/14/2014    CR 1.00 11/24/2013    CR 1.07 11/23/2013    CR 0.98 11/22/2013    CR 1.06 11/21/2013    CR 1.15 11/20/2013    CR 1.10 11/19/2013    CR 1.01 11/18/2013            Assessment and Plan:     Assessment: 67 year old male with asymptomatic phimosis. We discussed the implications of this and that treatment would be circumcision. We discussed the risks and benefits of this. In this setting, patient and family do not want any surgery. I advised careful hygiene to this area and that we could try steroid cream and/or circumcision if he starts having symptoms from his phimosis or issues with urinary troubles or infections. For now he will observe and will contact me if symptoms develop of his phimosis becomes more bothersome.    Plan:  Follow-up zara Coy MD  Urology  [facility]  Clinic Phone [phone]

## 2018-03-13 NOTE — MR AVS SNAPSHOT
"              After Visit Summary   3/13/2018    Rene Chowdhury    MRN: [id number]           Patient Information     Date Of Birth          1950        Visit Information        Provider Department      3/13/2018 10:30 AM Zafar Coy MD [facility] [place]        Today's Diagnoses     Phimosis    -  1    Spastic hemiplegia of left dominant side due to cerebrovascular disease, unspecified cerebrovascular disease type (H)        Left-sided sensory neglect           Follow-ups after your visit        Follow-up notes from your care team     Return if symptoms worsen or fail to improve.      Who to contact     If you have questions or need follow up information about today's clinic visit or your schedule please contact [facility] UROLOGY [facility] directly at [phone].  Normal or non-critical lab and imaging results will be communicated to you by Secure64hart, letter or phone within 4 business days after the clinic has received the results. If you do not hear from us within 7 days, please contact the clinic through Secure64hart or phone. If you have a critical or abnormal lab result, we will notify you by phone as soon as possible.  Submit refill requests through LoyaltyLion or call your pharmacy and they will forward the refill request to us. Please allow 3 business days for your refill to be completed.          Additional Information About Your Visit        Secure64hart Information     LoyaltyLion lets you send messages to your doctor, view your test results, renew your prescriptions, schedule appointments and more. To sign up, go to [email or web address] . Click on \"Log in\" on the left side of the screen, which will take you to the Welcome page. Then click on \"Sign up Now\" on the right side of the page.     You will be asked to enter the access code listed below, as well as some personal information. Please follow the directions to create " "your username and password.     Your access code is: [email or web address]  Expires: 2018 10:22 AM     Your access code will  in 90 days. If you need help or a new code, please call your [facility] or [phone].        Care EveryWhere ID     This is your Care EveryWhere ID. This could be used by other organizations to access your [place] medical records  [id number]        Your Vitals Were     Pulse Height Pulse Oximetry BMI (Body Mass Index)          72 1.778 m (5' 10\") 96% 30.85 kg/m2         Blood Pressure from Last 3 Encounters:   03/20/15 148/82   01/21/15 108/73   09/10/14 108/68    Weight from Last 3 Encounters:   18 97.5 kg (215 lb)   03/20/15 97.5 kg (215 lb)   14 93.2 kg (205 lb 6.4 oz)              Today, you had the following     No orders found for display       Primary Care Provider Office Phone # Fax #    Juli Sow [phone]       [facility] [address] [facility] [address]        Equal Access to Services     J[facility]ANIS [facility] AH: Hadii jonas hendrix hadasho Soomaali, waaxda luqadaha, qaybta kaalmada adeegyada, clarke daniel. So [facility] [phone].    ATENCIÓN: Si habla español, tiene a bay disposición servicios gratuitos de asistencia lingüística. Llame al [phone].    We comply with applicable federal civil rights laws and [place] laws. We do not discriminate on the basis of race, color, national origin, age, disability, sex, sexual orientation, or gender identity.            Thank you!     Thank you for choosing [facility] UROLOGY CLINIC [place]  for your care. Our goal is always to provide you with excellent care. Hearing back from our patients is one way we can continue to improve our services. Please take a few minutes to complete the written survey that you may receive in the mail after your visit with us. Thank you!             Your Updated Medication List - Protect others around you: Learn " how to safely use, store and throw away your medicines at www.disposemymeds.org.          This list is accurate as of 3/13/18 11:59 PM.  Always use your most recent med list.                   Brand Name Dispense Instructions for use Diagnosis    acetaminophen 160 MG/5ML suspension    TYLENOL     Take 15 mg/kg by mouth every 8 hours        CALMOSEPTINE 0.44-20.625 % Oint ointment   Generic drug:  menthol-zinc oxide      Apply 1 Application topically 4 times daily as needed        hydrALAZINE 10 MG tablet    APRESOLINE     Take 10 mg by mouth every 8 hours        labetalol 200 MG tablet    NORMODYNE    60 tablet    1 tablet (200 mg) by Oral or Feeding Tube route every 12 hours    Intracranial hemorrhage (H)       lamoTRIgine 100 MG tablet    LAMICTAL    32 tablet    Take 1.5 tablets (150 mg) by mouth daily for 21 days        levETIRAcetam 500 MG tablet    KEPPRA    60 tablet    Take 1 tablet (500 mg) by mouth 2 times daily        miconazole 2 % powder    MICATIN; MICRO GUARD     Apply topically every hour as needed (topical candidiasis)    Intracranial hemorrhage (H)       MILK OF MAGNESIA PO      Take 15 mg by mouth every 48 hours as needed        multivitamins with minerals Liqd liquid     200 mL    15 mLs by Oral or Feeding Tube route daily    Intracranial hemorrhage (H)       pantoprazole Susp suspension    PROTONIX    500 mL    20 mLs (40 mg) by Per Feeding Tube route daily    Intracranial hemorrhage (H)       * SENNA S 8.6-50 MG per tablet   Generic drug:  senna-docusate      Take 1 tablet by mouth 2 times daily as needed        * SENNA S 8.6-50 MG per tablet   Generic drug:  senna-docusate      Take 1 tablet by mouth daily        traMADol 50 MG tablet    ULTRAM     Take 50 mg by mouth every 8 hours as needed        traZODone 100 MG tablet    DESYREL     Take 100 mg by mouth At Bedtime        * Notice:  This list has 2 medication(s) that are the same as other medications prescribed for you. Read the directions  carefully, and ask your doctor or other care provider to review them with you.

## 2018-03-13 NOTE — LETTER
3/13/2018       RE: Rene Chowdhury  [address] [address] [address]     Dear Colleague,    Thank you for referring your patient, Rene Chowdhury, to the [facility] UROLOGY CL[facility]IC [place] [facility]. Please see a copy of my visit note below.          Chief Complaint:    Phimosis         Consult or Referral:     Mr. Rene Chowdhury is a 67 year old male seen in consultation from Dr. Sow.         History of Present Illness:   Rene Chowdhury is a very pleasant 67 year old male who presents with a history of phimosis. Patient states this has been chronic problem over past several months and has not been terribly bothersome to him. Had stroke with subsequent left hemiparesis in 2013. More recently was placed in nursing facility. Since that time, he has been noted to have phimosis with difficulties retracting foreskin Concerned from CNA regarding this issue. He has not had redness, bleeding, pain, infection, or difficulty voiding. Is otherwise asymptomatic.         Past Medical History:     Past Medical History:   Diagnosis Date     Altered mental status      Anxiety      Brain bleed (H)     nov 7/2013     Constipation      Depressive disorder      Encephalopathy      Gastro-oesophageal reflux disease      Hemiparesis affecting left side as late effect of stroke (H)      History of thrombophlebitis      Hypertension      Insomnia      Mumps      Obstructive sleep apnea      Pneumonia      Respiratory failure (H)      Sleep apnea      Unspecified cerebral artery occlusion with cerebral infarction      Weight loss           Past Surgical History:     Past Surgical History:   Procedure Laterality Date     CRANIOTOMY, EVACUATE HEMATOMA SUBDURAL, COMBINED  11/7/2013    Procedure: COMBINED CRANIOTOMY, EVACUATE HEMATOMA SUBDURAL;  Craniotomy, Right Frontal-Tempoparietal for Hematoma;  Surgeon: Carlos Eduardo Yanez MD;   "Location:  OR     LAPAROSCOPIC ASSISTED INSERTION TUBE GASTROTOMY  11/18/2013    Procedure: LAPAROSCOPIC ASSISTED INSERTION TUBE GASTROSTOMY;  LAPAROSCOPIC GASTROSTOMY TUBE PLACEMENT;  Surgeon: Jim Turk MD;  Location:  OR          Medications     Current Outpatient Prescriptions   Medication     menthol-zinc oxide (CALMOSEPTINE) 0.44-20.625 % OINT     labetalol (NORMODYNE) 200 MG tablet     lamoTRIgine (LAMICTAL) 100 MG tablet     hydrALAZINE (APRESOLINE) 10 MG tablet     levETIRAcetam (KEPPRA) 500 MG tablet     senna-docusate (SENNA S) 8.6-50 MG per tablet     Magnesium Hydroxide (MILK OF MAGNESIA PO)     traMADol (ULTRAM) 50 MG tablet     traZODone (DESYREL) 100 MG tablet     senna-docusate (SENNA S) 8.6-50 MG per tablet     acetaminophen (TYLENOL) 160 MG/5ML suspension     miconazole (MICATIN; MICRO GUARD) 2 % powder     multivitamins with minerals (CERTAVITE) LIQD     pantoprazole (PROTONIX) 2 mg/mL     No current facility-administered medications for this visit.           Family History:   History reviewed. No pertinent family history.         Social History:     Social History     Social History     Marital status:      Spouse name: N/A     Number of children: N/A     Years of education: N/A     Occupational History     Not on file.     Social History Main Topics     Smoking status: Never Smoker     Smokeless tobacco: Never Used     Alcohol use No     Drug use: No     Sexual activity: Not on file     Other Topics Concern     Not on file     Social History Narrative   Worked in aircraft maintenance         Allergies:   Baclofen         Review of Systems:  From intake questionnaire     Negative 14 system review except as noted on HPI, nurse's note.         Physical Exam:     Patient is a 67 year old  male   Vitals: Pulse 72, height 1.778 m (5' 10\"), weight 97.5 kg (215 lb), SpO2 96 %.  General Appearance Adult: Body mass index is 30.85 kg/(m^2).  Alert, no acute distress, oriented; in " wheelchair secondary to previous stroke  HENT: throat/mouth:normal, good dentition  Lungs: no respiratory distress, or pursed lip breathing  Heart: No obvious jugular venous distension present  Abdomen: obese, soft, nontender, no organomegaly or masses,   Musculoskeltal: extremities normal, no peripheral edema; left hemiparesis  Skin: no suspicious lesions or rashes  Neuro: Alert, oriented, speech and mentation normal; left hemiparesis  Psych: affect and mood normal  Gait: Normal  : penis uncircumcised. Phimosis noted. Unable to retract foreskin to visualize glans. No discharge or erythema. No evidence of infection.       Labs and Pathology:    I reviewed all applicable laboratory and pathology data and went over findings with patient  Significant for   Lab Results   Component Value Date    CR 1.27 03/20/2015    CR 1.30 04/14/2014    CR 1.05 03/14/2014    CR 1.00 11/24/2013    CR 1.07 11/23/2013    CR 0.98 11/22/2013    CR 1.06 11/21/2013    CR 1.15 11/20/2013    CR 1.10 11/19/2013    CR 1.01 11/18/2013            Assessment and Plan:     Assessment: 67 year old male with asymptomatic phimosis. We discussed the implications of this and that treatment would be circumcision. We discussed the risks and benefits of this. In this setting, patient and family do not want any surgery. I advised careful hygiene to this area and that we could try steroid cream and/or circumcision if he starts having symptoms from his phimosis or issues with urinary troubles or infections. For now he will observe and will contact me if symptoms develop of his phimosis becomes more bothersome.    Plan:  Follow-up zara Coy MD  Urology  [facility]  Clinic Phone [phone]

## 2018-03-13 NOTE — NURSING NOTE
Pt and family not completely sure of what meds he is on.  Pt states his pcp is unable to retract foreskin.  Sometimes has an odor.  PCP is at [facility].  TAMIKO Francois, CMA

## 2020-08-12 ENCOUNTER — RECORDS - HEALTHEAST (OUTPATIENT)
Dept: LAB | Facility: CLINIC | Age: 70
End: 2020-08-12

## 2020-08-12 LAB
ALBUMIN UR-MCNC: ABNORMAL MG/DL
APPEARANCE UR: ABNORMAL
BACTERIA #/AREA URNS HPF: ABNORMAL HPF
BILIRUB UR QL STRIP: NEGATIVE
COLOR UR AUTO: ABNORMAL
GLUCOSE UR STRIP-MCNC: NEGATIVE MG/DL
HGB UR QL STRIP: ABNORMAL
KETONES UR STRIP-MCNC: NEGATIVE MG/DL
LEUKOCYTE ESTERASE UR QL STRIP: ABNORMAL
NITRATE UR QL: NEGATIVE
PH UR STRIP: 6 [PH] (ref 4.5–8)
RBC #/AREA URNS AUTO: >100 HPF
SP GR UR STRIP: 1.02 (ref 1–1.03)
SQUAMOUS #/AREA URNS AUTO: ABNORMAL LPF
UROBILINOGEN UR STRIP-ACNC: ABNORMAL
WBC #/AREA URNS AUTO: ABNORMAL HPF

## 2020-08-13 LAB — BACTERIA SPEC CULT: NO GROWTH

## 2020-08-27 ENCOUNTER — RECORDS - HEALTHEAST (OUTPATIENT)
Dept: LAB | Facility: CLINIC | Age: 70
End: 2020-08-27

## 2020-08-29 LAB — BACTERIA SPEC CULT: ABNORMAL

## 2020-10-29 ENCOUNTER — RECORDS - HEALTHEAST (OUTPATIENT)
Dept: LAB | Facility: CLINIC | Age: 70
End: 2020-10-29

## 2020-10-30 LAB
ANION GAP SERPL CALCULATED.3IONS-SCNC: 13 MMOL/L (ref 5–18)
BUN SERPL-MCNC: 22 MG/DL (ref 8–28)
CALCIUM SERPL-MCNC: 9.3 MG/DL (ref 8.5–10.5)
CHLORIDE BLD-SCNC: 99 MMOL/L (ref 98–107)
CO2 SERPL-SCNC: 31 MMOL/L (ref 22–31)
CREAT SERPL-MCNC: 1.54 MG/DL (ref 0.7–1.3)
GFR SERPL CREATININE-BSD FRML MDRD: 45 ML/MIN/1.73M2
GLUCOSE BLD-MCNC: 75 MG/DL (ref 70–125)
POTASSIUM BLD-SCNC: 3.1 MMOL/L (ref 3.5–5)
SODIUM SERPL-SCNC: 143 MMOL/L (ref 136–145)

## 2020-11-08 ENCOUNTER — RECORDS - HEALTHEAST (OUTPATIENT)
Dept: LAB | Facility: CLINIC | Age: 70
End: 2020-11-08

## 2020-11-09 LAB
ANION GAP SERPL CALCULATED.3IONS-SCNC: 9 MMOL/L (ref 5–18)
BUN SERPL-MCNC: 33 MG/DL (ref 8–28)
CALCIUM SERPL-MCNC: 9.2 MG/DL (ref 8.5–10.5)
CHLORIDE BLD-SCNC: 101 MMOL/L (ref 98–107)
CO2 SERPL-SCNC: 30 MMOL/L (ref 22–31)
CREAT SERPL-MCNC: 1.7 MG/DL (ref 0.7–1.3)
GFR SERPL CREATININE-BSD FRML MDRD: 40 ML/MIN/1.73M2
GLUCOSE BLD-MCNC: 91 MG/DL (ref 70–125)
POTASSIUM BLD-SCNC: 3.6 MMOL/L (ref 3.5–5)
SODIUM SERPL-SCNC: 140 MMOL/L (ref 136–145)

## 2021-03-19 ENCOUNTER — RECORDS - HEALTHEAST (OUTPATIENT)
Dept: LAB | Facility: CLINIC | Age: 71
End: 2021-03-19

## 2021-03-19 LAB
SARS-COV-2 PCR COMMENT: NORMAL
SARS-COV-2 RNA SPEC QL NAA+PROBE: NEGATIVE
SARS-COV-2 VIRUS SPECIMEN SOURCE: NORMAL

## 2021-03-26 ENCOUNTER — RECORDS - HEALTHEAST (OUTPATIENT)
Dept: LAB | Facility: CLINIC | Age: 71
End: 2021-03-26

## 2021-04-01 ENCOUNTER — RECORDS - HEALTHEAST (OUTPATIENT)
Dept: LAB | Facility: CLINIC | Age: 71
End: 2021-04-01

## 2021-04-02 ENCOUNTER — RECORDS - HEALTHEAST (OUTPATIENT)
Dept: LAB | Facility: CLINIC | Age: 71
End: 2021-04-02

## 2021-04-02 LAB
ALBUMIN SERPL-MCNC: 3 G/DL (ref 3.5–5)
ALP SERPL-CCNC: 102 U/L (ref 45–120)
ALT SERPL W P-5'-P-CCNC: 19 U/L (ref 0–45)
ANION GAP SERPL CALCULATED.3IONS-SCNC: 7 MMOL/L (ref 5–18)
AST SERPL W P-5'-P-CCNC: 11 U/L (ref 0–40)
BASOPHILS # BLD AUTO: 0.1 THOU/UL (ref 0–0.2)
BASOPHILS NFR BLD AUTO: 1 % (ref 0–2)
BILIRUB DIRECT SERPL-MCNC: <0.1 MG/DL
BILIRUB SERPL-MCNC: 0.2 MG/DL (ref 0–1)
BUN SERPL-MCNC: 35 MG/DL (ref 8–28)
CALCIUM SERPL-MCNC: 8.4 MG/DL (ref 8.5–10.5)
CHLORIDE BLD-SCNC: 108 MMOL/L (ref 98–107)
CO2 SERPL-SCNC: 26 MMOL/L (ref 22–31)
CREAT SERPL-MCNC: 1.66 MG/DL (ref 0.7–1.3)
EOSINOPHIL # BLD AUTO: 0.2 THOU/UL (ref 0–0.4)
EOSINOPHIL NFR BLD AUTO: 5 % (ref 0–6)
ERYTHROCYTE [DISTWIDTH] IN BLOOD BY AUTOMATED COUNT: 14.5 % (ref 11–14.5)
GFR SERPL CREATININE-BSD FRML MDRD: 41 ML/MIN/1.73M2
GLUCOSE BLD-MCNC: 88 MG/DL (ref 70–125)
HCT VFR BLD AUTO: 31.1 % (ref 40–54)
HGB BLD-MCNC: 9.4 G/DL (ref 14–18)
IMM GRANULOCYTES # BLD: 0 THOU/UL
IMM GRANULOCYTES NFR BLD: 1 %
LYMPHOCYTES # BLD AUTO: 0.9 THOU/UL (ref 0.8–4.4)
LYMPHOCYTES NFR BLD AUTO: 18 % (ref 20–40)
MCH RBC QN AUTO: 27.4 PG (ref 27–34)
MCHC RBC AUTO-ENTMCNC: 30.2 G/DL (ref 32–36)
MCV RBC AUTO: 91 FL (ref 80–100)
MONOCYTES # BLD AUTO: 0.5 THOU/UL (ref 0–0.9)
MONOCYTES NFR BLD AUTO: 10 % (ref 2–10)
NEUTROPHILS # BLD AUTO: 3.2 THOU/UL (ref 2–7.7)
NEUTROPHILS NFR BLD AUTO: 66 % (ref 50–70)
PLATELET # BLD AUTO: 179 THOU/UL (ref 140–440)
PMV BLD AUTO: 9.3 FL (ref 8.5–12.5)
POTASSIUM BLD-SCNC: 4.2 MMOL/L (ref 3.5–5)
PROT SERPL-MCNC: 6.2 G/DL (ref 6–8)
RBC # BLD AUTO: 3.43 MILL/UL (ref 4.4–6.2)
SARS-COV-2 PCR COMMENT: NORMAL
SARS-COV-2 RNA SPEC QL NAA+PROBE: NEGATIVE
SARS-COV-2 VIRUS SPECIMEN SOURCE: NORMAL
SODIUM SERPL-SCNC: 141 MMOL/L (ref 136–145)
WBC: 4.9 THOU/UL (ref 4–11)

## 2021-04-05 LAB — 25(OH)D3 SERPL-MCNC: 16.5 NG/ML (ref 30–80)

## 2021-05-11 ENCOUNTER — RECORDS - HEALTHEAST (OUTPATIENT)
Dept: LAB | Facility: CLINIC | Age: 71
End: 2021-05-11

## 2021-05-11 LAB
ANION GAP SERPL CALCULATED.3IONS-SCNC: 12 MMOL/L (ref 5–18)
BASOPHILS # BLD AUTO: 0 THOU/UL (ref 0–0.2)
BASOPHILS NFR BLD AUTO: 1 % (ref 0–2)
BUN SERPL-MCNC: 30 MG/DL (ref 8–28)
CALCIUM SERPL-MCNC: 8.4 MG/DL (ref 8.5–10.5)
CHLORIDE BLD-SCNC: 105 MMOL/L (ref 98–107)
CHOLEST SERPL-MCNC: 262 MG/DL
CO2 SERPL-SCNC: 26 MMOL/L (ref 22–31)
CREAT SERPL-MCNC: 1.71 MG/DL (ref 0.7–1.3)
EOSINOPHIL # BLD AUTO: 0.2 THOU/UL (ref 0–0.4)
EOSINOPHIL NFR BLD AUTO: 3 % (ref 0–6)
ERYTHROCYTE [DISTWIDTH] IN BLOOD BY AUTOMATED COUNT: 15.6 % (ref 11–14.5)
FASTING STATUS PATIENT QL REPORTED: NO
GFR SERPL CREATININE-BSD FRML MDRD: 40 ML/MIN/1.73M2
GLUCOSE BLD-MCNC: 87 MG/DL (ref 70–125)
HBA1C MFR BLD: 5.2 %
HCT VFR BLD AUTO: 34 % (ref 40–54)
HDLC SERPL-MCNC: 37 MG/DL
HGB BLD-MCNC: 10.9 G/DL (ref 14–18)
IMM GRANULOCYTES # BLD: 0.1 THOU/UL
IMM GRANULOCYTES NFR BLD: 1 %
LDLC SERPL CALC-MCNC: 197 MG/DL
LYMPHOCYTES # BLD AUTO: 1 THOU/UL (ref 0.8–4.4)
LYMPHOCYTES NFR BLD AUTO: 18 % (ref 20–40)
MAGNESIUM SERPL-MCNC: 2.2 MG/DL (ref 1.8–2.6)
MCH RBC QN AUTO: 28.9 PG (ref 27–34)
MCHC RBC AUTO-ENTMCNC: 32.1 G/DL (ref 32–36)
MCV RBC AUTO: 90 FL (ref 80–100)
MONOCYTES # BLD AUTO: 0.4 THOU/UL (ref 0–0.9)
MONOCYTES NFR BLD AUTO: 8 % (ref 2–10)
NEUTROPHILS # BLD AUTO: 3.8 THOU/UL (ref 2–7.7)
NEUTROPHILS NFR BLD AUTO: 70 % (ref 50–70)
PLATELET # BLD AUTO: 200 THOU/UL (ref 140–440)
PMV BLD AUTO: 8.6 FL (ref 8.5–12.5)
POTASSIUM BLD-SCNC: 4.3 MMOL/L (ref 3.5–5)
RBC # BLD AUTO: 3.77 MILL/UL (ref 4.4–6.2)
SODIUM SERPL-SCNC: 143 MMOL/L (ref 136–145)
TRIGL SERPL-MCNC: 139 MG/DL
WBC: 5.4 THOU/UL (ref 4–11)

## 2021-07-01 ENCOUNTER — RECORDS - HEALTHEAST (OUTPATIENT)
Dept: LAB | Facility: CLINIC | Age: 71
End: 2021-07-01

## 2021-07-01 LAB
ANION GAP SERPL CALCULATED.3IONS-SCNC: 10 MMOL/L (ref 5–18)
BUN SERPL-MCNC: 31 MG/DL (ref 8–28)
CALCIUM SERPL-MCNC: 8.7 MG/DL (ref 8.5–10.5)
CHLORIDE BLD-SCNC: 104 MMOL/L (ref 98–107)
CO2 SERPL-SCNC: 27 MMOL/L (ref 22–31)
CREAT SERPL-MCNC: 1.66 MG/DL (ref 0.7–1.3)
GFR SERPL CREATININE-BSD FRML MDRD: 41 ML/MIN/1.73M2
GLUCOSE BLD-MCNC: 86 MG/DL (ref 70–125)
HBA1C MFR BLD: 5 %
MAGNESIUM SERPL-MCNC: 2 MG/DL (ref 1.8–2.6)
POTASSIUM BLD-SCNC: 4.3 MMOL/L (ref 3.5–5)
SODIUM SERPL-SCNC: 141 MMOL/L (ref 136–145)

## 2021-12-29 ENCOUNTER — LAB REQUISITION (OUTPATIENT)
Dept: LAB | Facility: CLINIC | Age: 71
End: 2021-12-29
Payer: MEDICARE

## 2021-12-29 DIAGNOSIS — N18.9 CHRONIC KIDNEY DISEASE, UNSPECIFIED: ICD-10-CM

## 2021-12-29 DIAGNOSIS — D64.9 ANEMIA, UNSPECIFIED: ICD-10-CM

## 2022-01-03 LAB
ALBUMIN SERPL-MCNC: 3.4 G/DL (ref 3.5–5)
ANION GAP SERPL CALCULATED.3IONS-SCNC: 10 MMOL/L (ref 5–18)
BUN SERPL-MCNC: 19 MG/DL (ref 8–28)
CALCIUM SERPL-MCNC: 8.9 MG/DL (ref 8.5–10.5)
CHLORIDE BLD-SCNC: 103 MMOL/L (ref 98–107)
CO2 SERPL-SCNC: 27 MMOL/L (ref 22–31)
CREAT SERPL-MCNC: 1.49 MG/DL (ref 0.7–1.3)
DEPRECATED CALCIDIOL+CALCIFEROL SERPL-MC: 36 UG/L (ref 30–80)
ERYTHROCYTE [DISTWIDTH] IN BLOOD BY AUTOMATED COUNT: 13.1 % (ref 10–15)
FERRITIN SERPL-MCNC: 42 NG/ML (ref 27–300)
GFR SERPL CREATININE-BSD FRML MDRD: 49 ML/MIN/1.73M2
GLUCOSE BLD-MCNC: 89 MG/DL (ref 70–125)
HCT VFR BLD AUTO: 38.6 % (ref 40–53)
HGB BLD-MCNC: 12.8 G/DL (ref 13.3–17.7)
IRON SATN MFR SERPL: 29 % (ref 20–50)
IRON SERPL-MCNC: 79 UG/DL (ref 42–175)
MCH RBC QN AUTO: 32.6 PG (ref 26.5–33)
MCHC RBC AUTO-ENTMCNC: 33.2 G/DL (ref 31.5–36.5)
MCV RBC AUTO: 98 FL (ref 78–100)
PLATELET # BLD AUTO: 168 10E3/UL (ref 150–450)
POTASSIUM BLD-SCNC: 4 MMOL/L (ref 3.5–5)
RBC # BLD AUTO: 3.93 10E6/UL (ref 4.4–5.9)
SODIUM SERPL-SCNC: 140 MMOL/L (ref 136–145)
TIBC SERPL-MCNC: 273 UG/DL (ref 313–563)
TRANSFERRIN SERPL-MCNC: 218 MG/DL (ref 212–360)
TSH SERPL DL<=0.005 MIU/L-ACNC: 1.56 UIU/ML (ref 0.3–5)
VIT B12 SERPL-MCNC: 437 PG/ML (ref 213–816)
WBC # BLD AUTO: 6.4 10E3/UL (ref 4–11)

## 2022-01-03 PROCEDURE — 36415 COLL VENOUS BLD VENIPUNCTURE: CPT | Mod: ORL | Performed by: INTERNAL MEDICINE

## 2022-01-03 PROCEDURE — 82040 ASSAY OF SERUM ALBUMIN: CPT | Mod: ORL | Performed by: INTERNAL MEDICINE

## 2022-01-03 PROCEDURE — 85027 COMPLETE CBC AUTOMATED: CPT | Mod: ORL | Performed by: INTERNAL MEDICINE

## 2022-01-03 PROCEDURE — 84466 ASSAY OF TRANSFERRIN: CPT | Mod: ORL | Performed by: INTERNAL MEDICINE

## 2022-01-03 PROCEDURE — 82728 ASSAY OF FERRITIN: CPT | Mod: ORL | Performed by: INTERNAL MEDICINE

## 2022-01-03 PROCEDURE — 82607 VITAMIN B-12: CPT | Mod: ORL | Performed by: INTERNAL MEDICINE

## 2022-01-03 PROCEDURE — 84443 ASSAY THYROID STIM HORMONE: CPT | Mod: ORL | Performed by: INTERNAL MEDICINE

## 2022-01-03 PROCEDURE — 82306 VITAMIN D 25 HYDROXY: CPT | Mod: ORL | Performed by: INTERNAL MEDICINE

## 2022-01-03 PROCEDURE — P9603 ONE-WAY ALLOW PRORATED MILES: HCPCS | Mod: ORL | Performed by: INTERNAL MEDICINE

## 2022-01-03 PROCEDURE — 80048 BASIC METABOLIC PNL TOTAL CA: CPT | Mod: ORL | Performed by: INTERNAL MEDICINE

## 2022-09-15 ENCOUNTER — LAB REQUISITION (OUTPATIENT)
Dept: LAB | Facility: CLINIC | Age: 72
End: 2022-09-15
Payer: MEDICARE

## 2022-09-15 DIAGNOSIS — D64.9 ANEMIA, UNSPECIFIED: ICD-10-CM

## 2022-09-15 DIAGNOSIS — I10 ESSENTIAL (PRIMARY) HYPERTENSION: ICD-10-CM

## 2022-09-19 LAB
ANION GAP SERPL CALCULATED.3IONS-SCNC: 10 MMOL/L (ref 7–15)
BUN SERPL-MCNC: 24.6 MG/DL (ref 8–23)
CALCIUM SERPL-MCNC: 9.3 MG/DL (ref 8.8–10.2)
CHLORIDE SERPL-SCNC: 102 MMOL/L (ref 98–107)
CREAT SERPL-MCNC: 1.48 MG/DL (ref 0.67–1.17)
DEPRECATED HCO3 PLAS-SCNC: 29 MMOL/L (ref 22–29)
ERYTHROCYTE [DISTWIDTH] IN BLOOD BY AUTOMATED COUNT: 13.8 % (ref 10–15)
GFR SERPL CREATININE-BSD FRML MDRD: 49 ML/MIN/1.73M2
GLUCOSE SERPL-MCNC: 107 MG/DL (ref 70–99)
HCT VFR BLD AUTO: 40.3 % (ref 40–53)
HGB BLD-MCNC: 12.8 G/DL (ref 13.3–17.7)
MCH RBC QN AUTO: 32 PG (ref 26.5–33)
MCHC RBC AUTO-ENTMCNC: 31.8 G/DL (ref 31.5–36.5)
MCV RBC AUTO: 101 FL (ref 78–100)
PLATELET # BLD AUTO: 180 10E3/UL (ref 150–450)
POTASSIUM SERPL-SCNC: 3.6 MMOL/L (ref 3.4–5.3)
RBC # BLD AUTO: 4 10E6/UL (ref 4.4–5.9)
SODIUM SERPL-SCNC: 141 MMOL/L (ref 136–145)
WBC # BLD AUTO: 7.8 10E3/UL (ref 4–11)

## 2022-09-19 PROCEDURE — 36415 COLL VENOUS BLD VENIPUNCTURE: CPT | Mod: ORL | Performed by: NURSE PRACTITIONER

## 2022-09-19 PROCEDURE — 80048 BASIC METABOLIC PNL TOTAL CA: CPT | Mod: ORL | Performed by: NURSE PRACTITIONER

## 2022-09-19 PROCEDURE — P9603 ONE-WAY ALLOW PRORATED MILES: HCPCS | Mod: ORL | Performed by: NURSE PRACTITIONER

## 2022-09-19 PROCEDURE — 85027 COMPLETE CBC AUTOMATED: CPT | Mod: ORL | Performed by: NURSE PRACTITIONER

## 2022-11-28 PROCEDURE — 87186 SC STD MICRODIL/AGAR DIL: CPT

## 2022-12-01 ENCOUNTER — LAB REQUISITION (OUTPATIENT)
Dept: LAB | Facility: CLINIC | Age: 72
End: 2022-12-01

## 2022-12-02 LAB — BACTERIA UR CULT: ABNORMAL

## 2023-01-16 ENCOUNTER — LAB REQUISITION (OUTPATIENT)
Dept: LAB | Facility: CLINIC | Age: 73
End: 2023-01-16
Payer: MEDICARE

## 2023-01-16 DIAGNOSIS — D75.89 OTHER SPECIFIED DISEASES OF BLOOD AND BLOOD-FORMING ORGANS: ICD-10-CM

## 2023-01-20 LAB
ANION GAP SERPL CALCULATED.3IONS-SCNC: 13 MMOL/L (ref 7–15)
BUN SERPL-MCNC: 25.7 MG/DL (ref 8–23)
CALCIUM SERPL-MCNC: 9.3 MG/DL (ref 8.8–10.2)
CHLORIDE SERPL-SCNC: 104 MMOL/L (ref 98–107)
CREAT SERPL-MCNC: 1.47 MG/DL (ref 0.67–1.17)
DEPRECATED HCO3 PLAS-SCNC: 25 MMOL/L (ref 22–29)
ERYTHROCYTE [DISTWIDTH] IN BLOOD BY AUTOMATED COUNT: 14.4 % (ref 10–15)
GFR SERPL CREATININE-BSD FRML MDRD: 50 ML/MIN/1.73M2
GLUCOSE SERPL-MCNC: 109 MG/DL (ref 70–99)
HCT VFR BLD AUTO: 39 % (ref 40–53)
HGB BLD-MCNC: 12.2 G/DL (ref 13.3–17.7)
MCH RBC QN AUTO: 31.2 PG (ref 26.5–33)
MCHC RBC AUTO-ENTMCNC: 31.3 G/DL (ref 31.5–36.5)
MCV RBC AUTO: 100 FL (ref 78–100)
PLATELET # BLD AUTO: 153 10E3/UL (ref 150–450)
POTASSIUM SERPL-SCNC: 4.1 MMOL/L (ref 3.4–5.3)
RBC # BLD AUTO: 3.91 10E6/UL (ref 4.4–5.9)
SODIUM SERPL-SCNC: 142 MMOL/L (ref 136–145)
WBC # BLD AUTO: 6.2 10E3/UL (ref 4–11)

## 2023-01-20 PROCEDURE — P9603 ONE-WAY ALLOW PRORATED MILES: HCPCS | Mod: ORL | Performed by: INTERNAL MEDICINE

## 2023-01-20 PROCEDURE — 85027 COMPLETE CBC AUTOMATED: CPT | Mod: ORL | Performed by: INTERNAL MEDICINE

## 2023-01-20 PROCEDURE — 80048 BASIC METABOLIC PNL TOTAL CA: CPT | Mod: ORL | Performed by: INTERNAL MEDICINE

## 2023-01-20 PROCEDURE — 36415 COLL VENOUS BLD VENIPUNCTURE: CPT | Mod: ORL | Performed by: INTERNAL MEDICINE

## 2023-12-28 ENCOUNTER — LAB REQUISITION (OUTPATIENT)
Dept: LAB | Facility: CLINIC | Age: 73
End: 2023-12-28
Payer: MEDICARE

## 2023-12-28 DIAGNOSIS — N18.30 CHRONIC KIDNEY DISEASE, STAGE 3 UNSPECIFIED (H): ICD-10-CM

## 2023-12-29 LAB
ANION GAP SERPL CALCULATED.3IONS-SCNC: 10 MMOL/L (ref 7–15)
BUN SERPL-MCNC: 18.1 MG/DL (ref 8–23)
CALCIUM SERPL-MCNC: 9.1 MG/DL (ref 8.8–10.2)
CHLORIDE SERPL-SCNC: 100 MMOL/L (ref 98–107)
CREAT SERPL-MCNC: 1.5 MG/DL (ref 0.67–1.17)
DEPRECATED HCO3 PLAS-SCNC: 30 MMOL/L (ref 22–29)
EGFRCR SERPLBLD CKD-EPI 2021: 48 ML/MIN/1.73M2
GLUCOSE SERPL-MCNC: 100 MG/DL (ref 70–99)
POTASSIUM SERPL-SCNC: 3.9 MMOL/L (ref 3.4–5.3)
SODIUM SERPL-SCNC: 140 MMOL/L (ref 135–145)

## 2023-12-29 PROCEDURE — P9604 ONE-WAY ALLOW PRORATED TRIP: HCPCS | Mod: ORL | Performed by: NURSE PRACTITIONER

## 2023-12-29 PROCEDURE — 80048 BASIC METABOLIC PNL TOTAL CA: CPT | Mod: ORL | Performed by: NURSE PRACTITIONER

## 2023-12-29 PROCEDURE — 36415 COLL VENOUS BLD VENIPUNCTURE: CPT | Mod: ORL | Performed by: NURSE PRACTITIONER

## 2024-01-18 ENCOUNTER — LAB REQUISITION (OUTPATIENT)
Dept: LAB | Facility: CLINIC | Age: 74
End: 2024-01-18
Payer: MEDICARE

## 2024-01-18 DIAGNOSIS — N18.30 CHRONIC KIDNEY DISEASE, STAGE 3 UNSPECIFIED (H): ICD-10-CM

## 2024-01-19 PROCEDURE — P9603 ONE-WAY ALLOW PRORATED MILES: HCPCS | Mod: ORL | Performed by: NURSE PRACTITIONER

## 2024-01-19 PROCEDURE — 80048 BASIC METABOLIC PNL TOTAL CA: CPT | Mod: ORL | Performed by: NURSE PRACTITIONER

## 2024-01-19 PROCEDURE — 36415 COLL VENOUS BLD VENIPUNCTURE: CPT | Mod: ORL | Performed by: NURSE PRACTITIONER

## 2024-01-20 LAB
ANION GAP SERPL CALCULATED.3IONS-SCNC: 10 MMOL/L (ref 7–15)
BUN SERPL-MCNC: 17.6 MG/DL (ref 8–23)
CALCIUM SERPL-MCNC: 8.9 MG/DL (ref 8.8–10.2)
CHLORIDE SERPL-SCNC: 103 MMOL/L (ref 98–107)
CREAT SERPL-MCNC: 1.5 MG/DL (ref 0.67–1.17)
DEPRECATED HCO3 PLAS-SCNC: 26 MMOL/L (ref 22–29)
EGFRCR SERPLBLD CKD-EPI 2021: 48 ML/MIN/1.73M2
GLUCOSE SERPL-MCNC: 102 MG/DL (ref 70–99)
POTASSIUM SERPL-SCNC: 3.9 MMOL/L (ref 3.4–5.3)
SODIUM SERPL-SCNC: 139 MMOL/L (ref 135–145)

## 2024-04-08 NOTE — ADDENDUM NOTE
Encounter addended by: Genesis Walters, PT on: 2/2/2018 12:41 PM<BR>     Actions taken: Flowsheet accepted Lorenzo

## 2024-09-10 ENCOUNTER — LAB REQUISITION (OUTPATIENT)
Dept: LAB | Facility: CLINIC | Age: 74
End: 2024-09-10
Payer: MEDICARE

## 2024-09-10 DIAGNOSIS — M10.9 GOUT, UNSPECIFIED: ICD-10-CM

## 2024-09-13 LAB — URATE SERPL-MCNC: 6.2 MG/DL (ref 3.4–7)

## 2024-09-13 PROCEDURE — 84550 ASSAY OF BLOOD/URIC ACID: CPT | Mod: ORL

## 2024-09-13 PROCEDURE — P9604 ONE-WAY ALLOW PRORATED TRIP: HCPCS | Mod: ORL

## 2024-09-13 PROCEDURE — 36415 COLL VENOUS BLD VENIPUNCTURE: CPT | Mod: ORL

## 2025-03-25 ENCOUNTER — LAB REQUISITION (OUTPATIENT)
Dept: LAB | Facility: CLINIC | Age: 75
End: 2025-03-25
Payer: MEDICARE

## 2025-03-25 DIAGNOSIS — N18.30 CHRONIC KIDNEY DISEASE, STAGE 3 UNSPECIFIED (H): ICD-10-CM

## 2025-03-25 DIAGNOSIS — H93.11 TINNITUS, RIGHT EAR: ICD-10-CM

## 2025-03-25 DIAGNOSIS — F39 UNSPECIFIED MOOD (AFFECTIVE) DISORDER: ICD-10-CM

## 2025-03-25 DIAGNOSIS — I10 ESSENTIAL (PRIMARY) HYPERTENSION: ICD-10-CM

## 2025-03-28 LAB
ANION GAP SERPL CALCULATED.3IONS-SCNC: 12 MMOL/L (ref 7–15)
BUN SERPL-MCNC: 15.3 MG/DL (ref 8–23)
CALCIUM SERPL-MCNC: 8.9 MG/DL (ref 8.8–10.4)
CHLORIDE SERPL-SCNC: 102 MMOL/L (ref 98–107)
CREAT SERPL-MCNC: 1.39 MG/DL (ref 0.67–1.17)
EGFRCR SERPLBLD CKD-EPI 2021: 53 ML/MIN/1.73M2
ERYTHROCYTE [DISTWIDTH] IN BLOOD BY AUTOMATED COUNT: 14.5 % (ref 10–15)
GLUCOSE SERPL-MCNC: 88 MG/DL (ref 70–99)
HCO3 SERPL-SCNC: 26 MMOL/L (ref 22–29)
HCT VFR BLD AUTO: 36.3 % (ref 40–53)
HGB BLD-MCNC: 11.8 G/DL (ref 13.3–17.7)
MCH RBC QN AUTO: 30.7 PG (ref 26.5–33)
MCHC RBC AUTO-ENTMCNC: 32.5 G/DL (ref 31.5–36.5)
MCV RBC AUTO: 95 FL (ref 78–100)
PLATELET # BLD AUTO: 183 10E3/UL (ref 150–450)
POTASSIUM SERPL-SCNC: 3.9 MMOL/L (ref 3.4–5.3)
RBC # BLD AUTO: 3.84 10E6/UL (ref 4.4–5.9)
SODIUM SERPL-SCNC: 140 MMOL/L (ref 135–145)
WBC # BLD AUTO: 6.4 10E3/UL (ref 4–11)

## 2025-03-28 PROCEDURE — 36415 COLL VENOUS BLD VENIPUNCTURE: CPT | Mod: ORL

## 2025-03-28 PROCEDURE — 85027 COMPLETE CBC AUTOMATED: CPT | Mod: ORL

## 2025-03-28 PROCEDURE — 80048 BASIC METABOLIC PNL TOTAL CA: CPT | Mod: ORL

## 2025-03-28 PROCEDURE — P9604 ONE-WAY ALLOW PRORATED TRIP: HCPCS | Mod: ORL
